# Patient Record
Sex: FEMALE | Race: BLACK OR AFRICAN AMERICAN | NOT HISPANIC OR LATINO | URBAN - METROPOLITAN AREA
[De-identification: names, ages, dates, MRNs, and addresses within clinical notes are randomized per-mention and may not be internally consistent; named-entity substitution may affect disease eponyms.]

---

## 2018-02-24 ENCOUNTER — OUTPATIENT (OUTPATIENT)
Dept: OUTPATIENT SERVICES | Facility: HOSPITAL | Age: 33
LOS: 1 days | End: 2018-02-24
Payer: COMMERCIAL

## 2018-02-24 DIAGNOSIS — Z3A.00 WEEKS OF GESTATION OF PREGNANCY NOT SPECIFIED: ICD-10-CM

## 2018-02-24 DIAGNOSIS — O26.899 OTHER SPECIFIED PREGNANCY RELATED CONDITIONS, UNSPECIFIED TRIMESTER: ICD-10-CM

## 2018-02-24 DIAGNOSIS — Z98.890 OTHER SPECIFIED POSTPROCEDURAL STATES: Chronic | ICD-10-CM

## 2018-02-24 LAB
ALBUMIN SERPL ELPH-MCNC: 3.6 G/DL — SIGNIFICANT CHANGE UP (ref 3.3–5)
ALP SERPL-CCNC: 109 U/L — SIGNIFICANT CHANGE UP (ref 40–120)
ALT FLD-CCNC: 19 U/L — SIGNIFICANT CHANGE UP (ref 4–33)
APPEARANCE UR: SIGNIFICANT CHANGE UP
AST SERPL-CCNC: 21 U/L — SIGNIFICANT CHANGE UP (ref 4–32)
B PERT DNA SPEC QL NAA+PROBE: SIGNIFICANT CHANGE UP
BILIRUB SERPL-MCNC: 0.6 MG/DL — SIGNIFICANT CHANGE UP (ref 0.2–1.2)
BILIRUB UR-MCNC: NEGATIVE — SIGNIFICANT CHANGE UP
BLOOD UR QL VISUAL: NEGATIVE — SIGNIFICANT CHANGE UP
BUN SERPL-MCNC: 8 MG/DL — SIGNIFICANT CHANGE UP (ref 7–23)
C PNEUM DNA SPEC QL NAA+PROBE: NOT DETECTED — SIGNIFICANT CHANGE UP
CALCIUM SERPL-MCNC: 8.5 MG/DL — SIGNIFICANT CHANGE UP (ref 8.4–10.5)
CHLORIDE SERPL-SCNC: 102 MMOL/L — SIGNIFICANT CHANGE UP (ref 98–107)
CO2 SERPL-SCNC: 21 MMOL/L — LOW (ref 22–31)
COLOR SPEC: YELLOW — SIGNIFICANT CHANGE UP
CREAT SERPL-MCNC: 0.6 MG/DL — SIGNIFICANT CHANGE UP (ref 0.5–1.3)
FLUAV H1 2009 PAND RNA SPEC QL NAA+PROBE: POSITIVE — HIGH
FLUAV H1 RNA SPEC QL NAA+PROBE: NOT DETECTED — SIGNIFICANT CHANGE UP
FLUAV H3 RNA SPEC QL NAA+PROBE: NOT DETECTED — SIGNIFICANT CHANGE UP
FLUBV RNA SPEC QL NAA+PROBE: NOT DETECTED — SIGNIFICANT CHANGE UP
GLUCOSE SERPL-MCNC: 73 MG/DL — SIGNIFICANT CHANGE UP (ref 70–99)
GLUCOSE UR-MCNC: NEGATIVE — SIGNIFICANT CHANGE UP
HADV DNA SPEC QL NAA+PROBE: NOT DETECTED — SIGNIFICANT CHANGE UP
HCOV 229E RNA SPEC QL NAA+PROBE: NOT DETECTED — SIGNIFICANT CHANGE UP
HCOV HKU1 RNA SPEC QL NAA+PROBE: NOT DETECTED — SIGNIFICANT CHANGE UP
HCOV NL63 RNA SPEC QL NAA+PROBE: NOT DETECTED — SIGNIFICANT CHANGE UP
HCOV OC43 RNA SPEC QL NAA+PROBE: NOT DETECTED — SIGNIFICANT CHANGE UP
HCT VFR BLD CALC: 36.1 % — SIGNIFICANT CHANGE UP (ref 34.5–45)
HGB BLD-MCNC: 11.4 G/DL — LOW (ref 11.5–15.5)
HMPV RNA SPEC QL NAA+PROBE: NOT DETECTED — SIGNIFICANT CHANGE UP
HPIV1 RNA SPEC QL NAA+PROBE: NOT DETECTED — SIGNIFICANT CHANGE UP
HPIV2 RNA SPEC QL NAA+PROBE: NOT DETECTED — SIGNIFICANT CHANGE UP
HPIV3 RNA SPEC QL NAA+PROBE: NOT DETECTED — SIGNIFICANT CHANGE UP
HPIV4 RNA SPEC QL NAA+PROBE: NOT DETECTED — SIGNIFICANT CHANGE UP
KETONES UR-MCNC: HIGH
LEUKOCYTE ESTERASE UR-ACNC: HIGH
M PNEUMO DNA SPEC QL NAA+PROBE: NOT DETECTED — SIGNIFICANT CHANGE UP
MCHC RBC-ENTMCNC: 29.8 PG — SIGNIFICANT CHANGE UP (ref 27–34)
MCHC RBC-ENTMCNC: 31.6 % — LOW (ref 32–36)
MCV RBC AUTO: 94.5 FL — SIGNIFICANT CHANGE UP (ref 80–100)
MUCOUS THREADS # UR AUTO: SIGNIFICANT CHANGE UP
NITRITE UR-MCNC: NEGATIVE — SIGNIFICANT CHANGE UP
NON-SQ EPI CELLS # UR AUTO: <1 — SIGNIFICANT CHANGE UP
NRBC # FLD: 0 — SIGNIFICANT CHANGE UP
PH UR: 6.5 — SIGNIFICANT CHANGE UP (ref 4.6–8)
PLATELET # BLD AUTO: 274 K/UL — SIGNIFICANT CHANGE UP (ref 150–400)
PMV BLD: 9 FL — SIGNIFICANT CHANGE UP (ref 7–13)
POTASSIUM SERPL-MCNC: 4 MMOL/L — SIGNIFICANT CHANGE UP (ref 3.5–5.3)
POTASSIUM SERPL-SCNC: 4 MMOL/L — SIGNIFICANT CHANGE UP (ref 3.5–5.3)
PROT SERPL-MCNC: 6.8 G/DL — SIGNIFICANT CHANGE UP (ref 6–8.3)
PROT UR-MCNC: 30 MG/DL — HIGH
RBC # BLD: 3.82 M/UL — SIGNIFICANT CHANGE UP (ref 3.8–5.2)
RBC # FLD: 11.8 % — SIGNIFICANT CHANGE UP (ref 10.3–14.5)
RBC CASTS # UR COMP ASSIST: SIGNIFICANT CHANGE UP (ref 0–?)
RSV RNA SPEC QL NAA+PROBE: NOT DETECTED — SIGNIFICANT CHANGE UP
RV+EV RNA SPEC QL NAA+PROBE: NOT DETECTED — SIGNIFICANT CHANGE UP
SODIUM SERPL-SCNC: 139 MMOL/L — SIGNIFICANT CHANGE UP (ref 135–145)
SP GR SPEC: 1.02 — SIGNIFICANT CHANGE UP (ref 1–1.04)
SQUAMOUS # UR AUTO: SIGNIFICANT CHANGE UP
UROBILINOGEN FLD QL: NORMAL MG/DL — SIGNIFICANT CHANGE UP
WBC # BLD: 5.89 K/UL — SIGNIFICANT CHANGE UP (ref 3.8–10.5)
WBC # FLD AUTO: 5.89 K/UL — SIGNIFICANT CHANGE UP (ref 3.8–10.5)
WBC UR QL: HIGH (ref 0–?)

## 2018-02-24 PROCEDURE — 71045 X-RAY EXAM CHEST 1 VIEW: CPT | Mod: 26

## 2018-02-24 RX ADMIN — Medication 75 MILLIGRAM(S): at 20:37

## 2018-02-26 LAB
BACTERIA UR CULT: SIGNIFICANT CHANGE UP
SPECIMEN SOURCE: SIGNIFICANT CHANGE UP

## 2018-03-23 ENCOUNTER — OUTPATIENT (OUTPATIENT)
Dept: OUTPATIENT SERVICES | Facility: HOSPITAL | Age: 33
LOS: 1 days | End: 2018-03-23

## 2018-03-23 DIAGNOSIS — Z3A.00 WEEKS OF GESTATION OF PREGNANCY NOT SPECIFIED: ICD-10-CM

## 2018-03-23 DIAGNOSIS — Z98.890 OTHER SPECIFIED POSTPROCEDURAL STATES: Chronic | ICD-10-CM

## 2018-03-23 DIAGNOSIS — O26.899 OTHER SPECIFIED PREGNANCY RELATED CONDITIONS, UNSPECIFIED TRIMESTER: ICD-10-CM

## 2018-03-23 LAB
ALBUMIN SERPL ELPH-MCNC: 3.4 G/DL — SIGNIFICANT CHANGE UP (ref 3.3–5)
ALP SERPL-CCNC: 121 U/L — HIGH (ref 40–120)
ALT FLD-CCNC: 15 U/L — SIGNIFICANT CHANGE UP (ref 4–33)
APTT BLD: 29.2 SEC — SIGNIFICANT CHANGE UP (ref 27.5–37.4)
AST SERPL-CCNC: 23 U/L — SIGNIFICANT CHANGE UP (ref 4–32)
BILIRUB SERPL-MCNC: 0.5 MG/DL — SIGNIFICANT CHANGE UP (ref 0.2–1.2)
BLD GP AB SCN SERPL QL: NEGATIVE — SIGNIFICANT CHANGE UP
BUN SERPL-MCNC: 9 MG/DL — SIGNIFICANT CHANGE UP (ref 7–23)
CALCIUM SERPL-MCNC: 9 MG/DL — SIGNIFICANT CHANGE UP (ref 8.4–10.5)
CHLORIDE SERPL-SCNC: 105 MMOL/L — SIGNIFICANT CHANGE UP (ref 98–107)
CO2 SERPL-SCNC: 18 MMOL/L — LOW (ref 22–31)
CREAT SERPL-MCNC: 0.63 MG/DL — SIGNIFICANT CHANGE UP (ref 0.5–1.3)
FIBRINOGEN PPP-MCNC: 517.2 MG/DL — HIGH (ref 310–510)
GLUCOSE SERPL-MCNC: 90 MG/DL — SIGNIFICANT CHANGE UP (ref 70–99)
HCT VFR BLD CALC: 35.9 % — SIGNIFICANT CHANGE UP (ref 34.5–45)
HGB BLD-MCNC: 11.7 G/DL — SIGNIFICANT CHANGE UP (ref 11.5–15.5)
INR BLD: 0.98 — SIGNIFICANT CHANGE UP (ref 0.88–1.17)
MCHC RBC-ENTMCNC: 30.8 PG — SIGNIFICANT CHANGE UP (ref 27–34)
MCHC RBC-ENTMCNC: 32.6 % — SIGNIFICANT CHANGE UP (ref 32–36)
MCV RBC AUTO: 94.5 FL — SIGNIFICANT CHANGE UP (ref 80–100)
NRBC # FLD: 0 — SIGNIFICANT CHANGE UP
PLATELET # BLD AUTO: 289 K/UL — SIGNIFICANT CHANGE UP (ref 150–400)
PMV BLD: 9 FL — SIGNIFICANT CHANGE UP (ref 7–13)
POTASSIUM SERPL-MCNC: 4.2 MMOL/L — SIGNIFICANT CHANGE UP (ref 3.5–5.3)
POTASSIUM SERPL-SCNC: 4.2 MMOL/L — SIGNIFICANT CHANGE UP (ref 3.5–5.3)
PROT SERPL-MCNC: 6.5 G/DL — SIGNIFICANT CHANGE UP (ref 6–8.3)
PROTHROM AB SERPL-ACNC: 11.3 SEC — SIGNIFICANT CHANGE UP (ref 9.8–13.1)
RBC # BLD: 3.8 M/UL — SIGNIFICANT CHANGE UP (ref 3.8–5.2)
RBC # FLD: 12.1 % — SIGNIFICANT CHANGE UP (ref 10.3–14.5)
RH IG SCN BLD-IMP: POSITIVE — SIGNIFICANT CHANGE UP
SODIUM SERPL-SCNC: 140 MMOL/L — SIGNIFICANT CHANGE UP (ref 135–145)
WBC # BLD: 5.57 K/UL — SIGNIFICANT CHANGE UP (ref 3.8–10.5)
WBC # FLD AUTO: 5.57 K/UL — SIGNIFICANT CHANGE UP (ref 3.8–10.5)

## 2018-04-08 ENCOUNTER — INPATIENT (INPATIENT)
Facility: HOSPITAL | Age: 33
LOS: 1 days | Discharge: HOME | End: 2018-04-10
Attending: OBSTETRICS & GYNECOLOGY | Admitting: OBSTETRICS & GYNECOLOGY

## 2018-04-08 VITALS — WEIGHT: 176.37 LBS | HEIGHT: 64 IN

## 2018-04-08 DIAGNOSIS — Z3A.00 WEEKS OF GESTATION OF PREGNANCY NOT SPECIFIED: ICD-10-CM

## 2018-04-08 DIAGNOSIS — Z98.890 OTHER SPECIFIED POSTPROCEDURAL STATES: Chronic | ICD-10-CM

## 2018-04-08 DIAGNOSIS — O26.899 OTHER SPECIFIED PREGNANCY RELATED CONDITIONS, UNSPECIFIED TRIMESTER: ICD-10-CM

## 2018-04-08 LAB
BASOPHILS # BLD AUTO: 0.03 K/UL — SIGNIFICANT CHANGE UP (ref 0–0.2)
BASOPHILS NFR BLD AUTO: 0.5 % — SIGNIFICANT CHANGE UP (ref 0–2)
BLD GP AB SCN SERPL QL: NEGATIVE — SIGNIFICANT CHANGE UP
EOSINOPHIL # BLD AUTO: 0.07 K/UL — SIGNIFICANT CHANGE UP (ref 0–0.5)
EOSINOPHIL NFR BLD AUTO: 1.1 % — SIGNIFICANT CHANGE UP (ref 0–6)
HCT VFR BLD CALC: 37.6 % — SIGNIFICANT CHANGE UP (ref 34.5–45)
HGB BLD-MCNC: 12.3 G/DL — SIGNIFICANT CHANGE UP (ref 11.5–15.5)
IMM GRANULOCYTES # BLD AUTO: 0.03 # — SIGNIFICANT CHANGE UP
IMM GRANULOCYTES NFR BLD AUTO: 0.5 % — SIGNIFICANT CHANGE UP (ref 0–1.5)
LYMPHOCYTES # BLD AUTO: 2.32 K/UL — SIGNIFICANT CHANGE UP (ref 1–3.3)
LYMPHOCYTES # BLD AUTO: 36.2 % — SIGNIFICANT CHANGE UP (ref 13–44)
MCHC RBC-ENTMCNC: 30.8 PG — SIGNIFICANT CHANGE UP (ref 27–34)
MCHC RBC-ENTMCNC: 32.7 % — SIGNIFICANT CHANGE UP (ref 32–36)
MCV RBC AUTO: 94.2 FL — SIGNIFICANT CHANGE UP (ref 80–100)
MONOCYTES # BLD AUTO: 0.58 K/UL — SIGNIFICANT CHANGE UP (ref 0–0.9)
MONOCYTES NFR BLD AUTO: 9 % — SIGNIFICANT CHANGE UP (ref 2–14)
NEUTROPHILS # BLD AUTO: 3.38 K/UL — SIGNIFICANT CHANGE UP (ref 1.8–7.4)
NEUTROPHILS NFR BLD AUTO: 52.7 % — SIGNIFICANT CHANGE UP (ref 43–77)
NRBC # FLD: 0 — SIGNIFICANT CHANGE UP
PLATELET # BLD AUTO: 327 K/UL — SIGNIFICANT CHANGE UP (ref 150–400)
PMV BLD: 9.3 FL — SIGNIFICANT CHANGE UP (ref 7–13)
RBC # BLD: 3.99 M/UL — SIGNIFICANT CHANGE UP (ref 3.8–5.2)
RBC # FLD: 11.7 % — SIGNIFICANT CHANGE UP (ref 10.3–14.5)
RH IG SCN BLD-IMP: POSITIVE — SIGNIFICANT CHANGE UP
WBC # BLD: 6.41 K/UL — SIGNIFICANT CHANGE UP (ref 3.8–10.5)
WBC # FLD AUTO: 6.41 K/UL — SIGNIFICANT CHANGE UP (ref 3.8–10.5)

## 2018-04-08 RX ORDER — OXYTOCIN 10 UNIT/ML
333.33 VIAL (ML) INJECTION
Qty: 20 | Refills: 0 | Status: COMPLETED | OUTPATIENT
Start: 2018-04-08

## 2018-04-08 RX ORDER — SODIUM CHLORIDE 9 MG/ML
1000 INJECTION, SOLUTION INTRAVENOUS
Qty: 0 | Refills: 0 | Status: DISCONTINUED | OUTPATIENT
Start: 2018-04-08 | End: 2018-04-08

## 2018-04-08 RX ORDER — ACETAMINOPHEN 500 MG
975 TABLET ORAL EVERY 6 HOURS
Qty: 0 | Refills: 0 | Status: COMPLETED | OUTPATIENT
Start: 2018-04-08 | End: 2019-03-07

## 2018-04-08 RX ORDER — DIBUCAINE 1 %
1 OINTMENT (GRAM) RECTAL EVERY 4 HOURS
Qty: 0 | Refills: 0 | Status: DISCONTINUED | OUTPATIENT
Start: 2018-04-08 | End: 2018-04-09

## 2018-04-08 RX ORDER — INFLUENZA VIRUS VACCINE 15; 15; 15; 15 UG/.5ML; UG/.5ML; UG/.5ML; UG/.5ML
0.5 SUSPENSION INTRAMUSCULAR ONCE
Qty: 0 | Refills: 0 | Status: DISCONTINUED | OUTPATIENT
Start: 2018-04-08 | End: 2018-04-10

## 2018-04-08 RX ORDER — SODIUM CHLORIDE 9 MG/ML
3 INJECTION INTRAMUSCULAR; INTRAVENOUS; SUBCUTANEOUS EVERY 8 HOURS
Qty: 0 | Refills: 0 | Status: DISCONTINUED | OUTPATIENT
Start: 2018-04-08 | End: 2018-04-09

## 2018-04-08 RX ORDER — GLYCERIN ADULT
1 SUPPOSITORY, RECTAL RECTAL AT BEDTIME
Qty: 0 | Refills: 0 | Status: DISCONTINUED | OUTPATIENT
Start: 2018-04-08 | End: 2018-04-10

## 2018-04-08 RX ORDER — PRAMOXINE HYDROCHLORIDE 150 MG/15G
1 AEROSOL, FOAM RECTAL EVERY 4 HOURS
Qty: 0 | Refills: 0 | Status: DISCONTINUED | OUTPATIENT
Start: 2018-04-08 | End: 2018-04-09

## 2018-04-08 RX ORDER — DOCUSATE SODIUM 100 MG
100 CAPSULE ORAL
Qty: 0 | Refills: 0 | Status: DISCONTINUED | OUTPATIENT
Start: 2018-04-08 | End: 2018-04-10

## 2018-04-08 RX ORDER — SIMETHICONE 80 MG/1
80 TABLET, CHEWABLE ORAL EVERY 6 HOURS
Qty: 0 | Refills: 0 | Status: DISCONTINUED | OUTPATIENT
Start: 2018-04-08 | End: 2018-04-10

## 2018-04-08 RX ORDER — MAGNESIUM HYDROXIDE 400 MG/1
30 TABLET, CHEWABLE ORAL
Qty: 0 | Refills: 0 | Status: DISCONTINUED | OUTPATIENT
Start: 2018-04-08 | End: 2018-04-10

## 2018-04-08 RX ORDER — OXYCODONE HYDROCHLORIDE 5 MG/1
5 TABLET ORAL
Qty: 0 | Refills: 0 | Status: DISCONTINUED | OUTPATIENT
Start: 2018-04-08 | End: 2018-04-10

## 2018-04-08 RX ORDER — OXYCODONE HYDROCHLORIDE 5 MG/1
5 TABLET ORAL EVERY 4 HOURS
Qty: 0 | Refills: 0 | Status: DISCONTINUED | OUTPATIENT
Start: 2018-04-08 | End: 2018-04-10

## 2018-04-08 RX ORDER — IBUPROFEN 200 MG
600 TABLET ORAL EVERY 6 HOURS
Qty: 0 | Refills: 0 | Status: COMPLETED | OUTPATIENT
Start: 2018-04-08 | End: 2019-03-07

## 2018-04-08 RX ORDER — OXYTOCIN 10 UNIT/ML
333.33 VIAL (ML) INJECTION
Qty: 20 | Refills: 0 | Status: DISCONTINUED | OUTPATIENT
Start: 2018-04-08 | End: 2018-04-08

## 2018-04-08 RX ORDER — DIPHENHYDRAMINE HCL 50 MG
25 CAPSULE ORAL EVERY 6 HOURS
Qty: 0 | Refills: 0 | Status: DISCONTINUED | OUTPATIENT
Start: 2018-04-08 | End: 2018-04-10

## 2018-04-08 RX ORDER — AER TRAVELER 0.5 G/1
1 SOLUTION RECTAL; TOPICAL EVERY 4 HOURS
Qty: 0 | Refills: 0 | Status: DISCONTINUED | OUTPATIENT
Start: 2018-04-08 | End: 2018-04-09

## 2018-04-08 RX ORDER — SODIUM CHLORIDE 9 MG/ML
1000 INJECTION, SOLUTION INTRAVENOUS ONCE
Qty: 0 | Refills: 0 | Status: COMPLETED | OUTPATIENT
Start: 2018-04-08 | End: 2018-04-08

## 2018-04-08 RX ORDER — OXYTOCIN 10 UNIT/ML
41.67 VIAL (ML) INJECTION
Qty: 20 | Refills: 0 | Status: DISCONTINUED | OUTPATIENT
Start: 2018-04-08 | End: 2018-04-09

## 2018-04-08 RX ORDER — HYDROCORTISONE 1 %
1 OINTMENT (GRAM) TOPICAL EVERY 4 HOURS
Qty: 0 | Refills: 0 | Status: DISCONTINUED | OUTPATIENT
Start: 2018-04-08 | End: 2018-04-09

## 2018-04-08 RX ORDER — TETANUS TOXOID, REDUCED DIPHTHERIA TOXOID AND ACELLULAR PERTUSSIS VACCINE, ADSORBED 5; 2.5; 8; 8; 2.5 [IU]/.5ML; [IU]/.5ML; UG/.5ML; UG/.5ML; UG/.5ML
0.5 SUSPENSION INTRAMUSCULAR ONCE
Qty: 0 | Refills: 0 | Status: DISCONTINUED | OUTPATIENT
Start: 2018-04-08 | End: 2018-04-10

## 2018-04-08 RX ORDER — LANOLIN
1 OINTMENT (GRAM) TOPICAL EVERY 6 HOURS
Qty: 0 | Refills: 0 | Status: DISCONTINUED | OUTPATIENT
Start: 2018-04-08 | End: 2018-04-10

## 2018-04-08 RX ORDER — OXYTOCIN 10 UNIT/ML
2 VIAL (ML) INJECTION
Qty: 30 | Refills: 0 | Status: DISCONTINUED | OUTPATIENT
Start: 2018-04-08 | End: 2018-04-08

## 2018-04-08 RX ORDER — KETOROLAC TROMETHAMINE 30 MG/ML
30 SYRINGE (ML) INJECTION ONCE
Qty: 0 | Refills: 0 | Status: DISCONTINUED | OUTPATIENT
Start: 2018-04-08 | End: 2018-04-10

## 2018-04-08 RX ADMIN — Medication 125 MILLIUNIT(S)/MIN: at 23:26

## 2018-04-08 RX ADMIN — SODIUM CHLORIDE 2000 MILLILITER(S): 9 INJECTION, SOLUTION INTRAVENOUS at 21:11

## 2018-04-08 RX ADMIN — Medication 2 MILLIUNIT(S)/MIN: at 22:34

## 2018-04-08 RX ADMIN — Medication 1000 MILLIUNIT(S)/MIN: at 23:15

## 2018-04-08 RX ADMIN — SODIUM CHLORIDE 125 MILLILITER(S): 9 INJECTION, SOLUTION INTRAVENOUS at 22:34

## 2018-04-09 LAB — T PALLIDUM AB TITR SER: NEGATIVE — SIGNIFICANT CHANGE UP

## 2018-04-09 RX ORDER — ACETAMINOPHEN 500 MG
975 TABLET ORAL EVERY 6 HOURS
Qty: 0 | Refills: 0 | Status: DISCONTINUED | OUTPATIENT
Start: 2018-04-09 | End: 2018-04-10

## 2018-04-09 RX ORDER — IBUPROFEN 200 MG
600 TABLET ORAL EVERY 6 HOURS
Qty: 0 | Refills: 0 | Status: DISCONTINUED | OUTPATIENT
Start: 2018-04-09 | End: 2018-04-10

## 2018-04-09 RX ADMIN — Medication 600 MILLIGRAM(S): at 04:00

## 2018-04-09 RX ADMIN — Medication 600 MILLIGRAM(S): at 04:39

## 2018-04-09 RX ADMIN — Medication 100 MILLIGRAM(S): at 17:00

## 2018-04-09 RX ADMIN — Medication 600 MILLIGRAM(S): at 16:15

## 2018-04-09 NOTE — LACTATION INITIAL EVALUATION - LACTATION INTERVENTIONS
assisted with deep latch and positioning discussed  signs  of  effective  feeding and  swallowing.  discussed  compression at  breast when  nbn  stops  drinking  and  is  still sucking.  instructed  to offer both  breast at a feeding ,feed on cue and safe  skin to skin./initiate skin to skin/initiate hand expression routine

## 2018-04-09 NOTE — LACTATION INITIAL EVALUATION - INTERVENTION OUTCOME
nbn demonstrated  deep latch and  performed  with sucking and swallowing  noted  .  offered assistance  as  needed  . reviewed  how  to  know  nbn  is  effective at  feeding  and   getting  enough  breast  milk./demonstrates understanding of teaching/verbalizes understanding

## 2018-04-10 ENCOUNTER — TRANSCRIPTION ENCOUNTER (OUTPATIENT)
Age: 33
End: 2018-04-10

## 2018-04-10 VITALS
RESPIRATION RATE: 18 BRPM | DIASTOLIC BLOOD PRESSURE: 64 MMHG | HEART RATE: 67 BPM | TEMPERATURE: 98 F | OXYGEN SATURATION: 100 % | SYSTOLIC BLOOD PRESSURE: 122 MMHG

## 2018-04-10 RX ORDER — ACETAMINOPHEN 500 MG
3 TABLET ORAL
Qty: 0 | Refills: 0 | DISCHARGE
Start: 2018-04-10

## 2018-04-10 RX ORDER — IBUPROFEN 200 MG
1 TABLET ORAL
Qty: 0 | Refills: 0 | DISCHARGE
Start: 2018-04-10

## 2018-04-10 RX ADMIN — Medication 0.5 MILLILITER(S): at 11:03

## 2018-04-10 RX ADMIN — Medication 600 MILLIGRAM(S): at 11:10

## 2018-04-10 RX ADMIN — Medication 600 MILLIGRAM(S): at 11:42

## 2018-04-10 RX ADMIN — Medication 100 MILLIGRAM(S): at 11:09

## 2018-04-10 NOTE — DISCHARGE NOTE OB - MATERIALS PROVIDED
Back To Sleep Handout/Birth Certificate Instructions/  Immunization Record/Breastfeeding Log/Breastfeeding Mother’s Support Group Information/[facility] Hearing Screen Program/Guide to Postpartum Care/[facility]  Screening Program/Bottle Feeding Log/Shaken Baby Prevention Handout/Breastfeeding Guide and Packet

## 2018-04-10 NOTE — DISCHARGE NOTE OB - MEDICATION SUMMARY - MEDICATIONS TO STOP TAKING
I will STOP taking the medications listed below when I get home from the hospital:    oseltamivir 75 mg oral capsule  -- 1 cap(s) by mouth 2 times a day   -- Check with your doctor before becoming pregnant.  Finish all this medication unless otherwise directed by prescriber.

## 2018-04-10 NOTE — DISCHARGE NOTE OB - PATIENT PORTAL LINK FT
You can access the [employer] [facility], offered by [facility], by registering with the following website: [email or web address]

## 2018-04-10 NOTE — DISCHARGE NOTE OB - MEDICATION SUMMARY - MEDICATIONS TO TAKE
I will START or STAY ON the medications listed below when I get home from the hospital:    ibuprofen 600 mg oral tablet  -- 1 tab(s) by mouth every 6 hours, As Needed  -- Indication: For Other pregnancy-related conditions, antepartum    acetaminophen 325 mg oral tablet  -- 3 tab(s) by mouth every 6 hours, As Needed  -- Indication: For Other pregnancy-related conditions, antepartum    Prenatal Multivitamins with Folic Acid 1 mg oral tablet  -- 1 tab(s) by mouth once a day  -- Indication: For Other pregnancy-related conditions, antepartum

## 2019-09-15 ENCOUNTER — TRANSCRIPTION ENCOUNTER (OUTPATIENT)
Age: 34
End: 2019-09-15

## 2019-09-16 ENCOUNTER — OUTPATIENT (OUTPATIENT)
Dept: INPATIENT UNIT | Facility: HOSPITAL | Age: 34
LOS: 1 days | Discharge: HOME | End: 2019-09-16

## 2019-09-16 VITALS
HEART RATE: 82 BPM | RESPIRATION RATE: 18 BRPM | HEIGHT: 64 IN | SYSTOLIC BLOOD PRESSURE: 122 MMHG | TEMPERATURE: 98 F | DIASTOLIC BLOOD PRESSURE: 73 MMHG | WEIGHT: 173.06 LBS

## 2019-09-16 VITALS — RESPIRATION RATE: 24 BRPM | HEART RATE: 90 BPM | OXYGEN SATURATION: 100 %

## 2019-09-16 DIAGNOSIS — O34.30 MATERNAL CARE FOR CERVICAL INCOMPETENCE, UNSPECIFIED TRIMESTER: ICD-10-CM

## 2019-09-16 DIAGNOSIS — Z98.890 OTHER SPECIFIED POSTPROCEDURAL STATES: Chronic | ICD-10-CM

## 2019-09-16 LAB
BASOPHILS # BLD AUTO: 0.03 K/UL — SIGNIFICANT CHANGE UP (ref 0–0.2)
BASOPHILS NFR BLD AUTO: 0.5 % — SIGNIFICANT CHANGE UP (ref 0–2)
BLD GP AB SCN SERPL QL: NEGATIVE — SIGNIFICANT CHANGE UP
EOSINOPHIL # BLD AUTO: 0.05 K/UL — SIGNIFICANT CHANGE UP (ref 0–0.5)
EOSINOPHIL NFR BLD AUTO: 0.8 % — SIGNIFICANT CHANGE UP (ref 0–6)
HCT VFR BLD CALC: 34.4 % — LOW (ref 34.5–45)
HGB BLD-MCNC: 11 G/DL — LOW (ref 11.5–15.5)
IMM GRANULOCYTES NFR BLD AUTO: 0.8 % — SIGNIFICANT CHANGE UP (ref 0–1.5)
LYMPHOCYTES # BLD AUTO: 1.54 K/UL — SIGNIFICANT CHANGE UP (ref 1–3.3)
LYMPHOCYTES # BLD AUTO: 23.7 % — SIGNIFICANT CHANGE UP (ref 13–44)
MCHC RBC-ENTMCNC: 29.7 PG — SIGNIFICANT CHANGE UP (ref 27–34)
MCHC RBC-ENTMCNC: 32 % — SIGNIFICANT CHANGE UP (ref 32–36)
MCV RBC AUTO: 93 FL — SIGNIFICANT CHANGE UP (ref 80–100)
MONOCYTES # BLD AUTO: 0.41 K/UL — SIGNIFICANT CHANGE UP (ref 0–0.9)
MONOCYTES NFR BLD AUTO: 6.3 % — SIGNIFICANT CHANGE UP (ref 2–14)
NEUTROPHILS # BLD AUTO: 4.41 K/UL — SIGNIFICANT CHANGE UP (ref 1.8–7.4)
NEUTROPHILS NFR BLD AUTO: 67.9 % — SIGNIFICANT CHANGE UP (ref 43–77)
NRBC # FLD: 0.02 K/UL — SIGNIFICANT CHANGE UP (ref 0–0)
PLATELET # BLD AUTO: 311 K/UL — SIGNIFICANT CHANGE UP (ref 150–400)
PMV BLD: 9.4 FL — SIGNIFICANT CHANGE UP (ref 7–13)
RBC # BLD: 3.7 M/UL — LOW (ref 3.8–5.2)
RBC # FLD: 11.6 % — SIGNIFICANT CHANGE UP (ref 10.3–14.5)
RH IG SCN BLD-IMP: POSITIVE — SIGNIFICANT CHANGE UP
WBC # BLD: 6.49 K/UL — SIGNIFICANT CHANGE UP (ref 3.8–10.5)
WBC # FLD AUTO: 6.49 K/UL — SIGNIFICANT CHANGE UP (ref 3.8–10.5)

## 2019-09-16 RX ORDER — ACETAMINOPHEN 500 MG
1000 TABLET ORAL ONCE
Refills: 0 | Status: COMPLETED | OUTPATIENT
Start: 2019-09-16 | End: 2019-09-16

## 2019-09-16 RX ORDER — INDOMETHACIN 50 MG
50 CAPSULE ORAL ONCE
Refills: 0 | Status: COMPLETED | OUTPATIENT
Start: 2019-09-16 | End: 2019-09-16

## 2019-09-16 RX ORDER — FAMOTIDINE 10 MG/ML
20 INJECTION INTRAVENOUS ONCE
Refills: 0 | Status: COMPLETED | OUTPATIENT
Start: 2019-09-16 | End: 2019-09-16

## 2019-09-16 RX ORDER — INDOMETHACIN 50 MG
1 CAPSULE ORAL
Qty: 8 | Refills: 0
Start: 2019-09-16 | End: 2019-09-17

## 2019-09-16 RX ORDER — CITRIC ACID/SODIUM CITRATE 300-500 MG
30 SOLUTION, ORAL ORAL ONCE
Refills: 0 | Status: COMPLETED | OUTPATIENT
Start: 2019-09-16 | End: 2019-09-16

## 2019-09-16 RX ORDER — SODIUM CHLORIDE 9 MG/ML
1000 INJECTION, SOLUTION INTRAVENOUS
Refills: 0 | Status: DISCONTINUED | OUTPATIENT
Start: 2019-09-16 | End: 2019-09-16

## 2019-09-16 RX ORDER — INFLUENZA VIRUS VACCINE 15; 15; 15; 15 UG/.5ML; UG/.5ML; UG/.5ML; UG/.5ML
0.5 SUSPENSION INTRAMUSCULAR ONCE
Refills: 0 | Status: DISCONTINUED | OUTPATIENT
Start: 2019-09-16 | End: 2019-09-16

## 2019-09-16 RX ORDER — INDOMETHACIN 50 MG
25 CAPSULE ORAL EVERY 6 HOURS
Refills: 0 | Status: DISCONTINUED | OUTPATIENT
Start: 2019-09-16 | End: 2019-09-16

## 2019-09-16 RX ORDER — METOCLOPRAMIDE HCL 10 MG
10 TABLET ORAL ONCE
Refills: 0 | Status: COMPLETED | OUTPATIENT
Start: 2019-09-16 | End: 2019-09-16

## 2019-09-16 RX ORDER — SODIUM CHLORIDE 9 MG/ML
1000 INJECTION, SOLUTION INTRAVENOUS ONCE
Refills: 0 | Status: COMPLETED | OUTPATIENT
Start: 2019-09-16 | End: 2019-09-16

## 2019-09-16 RX ORDER — OXYTOCIN 10 UNIT/ML
333.33 VIAL (ML) INJECTION
Qty: 20 | Refills: 0 | Status: DISCONTINUED | OUTPATIENT
Start: 2019-09-16 | End: 2019-09-16

## 2019-09-16 RX ADMIN — Medication 50 MILLIGRAM(S): at 07:27

## 2019-09-16 RX ADMIN — Medication 400 MILLIGRAM(S): at 10:42

## 2019-09-16 RX ADMIN — FAMOTIDINE 20 MILLIGRAM(S): 10 INJECTION INTRAVENOUS at 07:17

## 2019-09-16 RX ADMIN — Medication 30 MILLILITER(S): at 07:18

## 2019-09-16 RX ADMIN — Medication 1000 MILLIGRAM(S): at 10:55

## 2019-09-16 RX ADMIN — SODIUM CHLORIDE 125 MILLILITER(S): 9 INJECTION, SOLUTION INTRAVENOUS at 09:57

## 2019-09-16 RX ADMIN — SODIUM CHLORIDE 2000 MILLILITER(S): 9 INJECTION, SOLUTION INTRAVENOUS at 06:52

## 2019-09-16 RX ADMIN — Medication 25 MILLIGRAM(S): at 13:35

## 2019-09-16 RX ADMIN — Medication 10 MILLIGRAM(S): at 07:17

## 2019-09-16 RX ADMIN — Medication 50 MILLIGRAM(S): at 07:45

## 2019-09-16 NOTE — OB PROVIDER H&P - ASSESSMENT
32 yo Female  at 19w5d presents for rescue cerclage placement for shortened cervix    Admit to L&D  Routine labs/nst/ivf @200cc/hr  Pepcid, Reglan, Bicitra  Indocin 50mg x1 pre op  prenatal record summary available  prenatal work sheet reviewed  sono to confirm FH  pt willing to accept blood if needed, consents obtained  Dr. Jackson informed  Anesthesia consult for pain management    [facility], PAC  #[id number]

## 2019-09-16 NOTE — OB PROVIDER H&P - NS_OBGYNHISTORY_OBGYN_ALL_OB_FT
2014  FT male [id number] no complications  2018  f/t male 7lb1oz no complications    2017 <12w D&C    gyn: denies abn pap/std/hpv

## 2019-09-16 NOTE — BRIEF OPERATIVE NOTE - NSICDXBRIEFPOSTOP_GEN_ALL_CORE_FT
POST-OP DIAGNOSIS:  Cervical insufficiency in pregnancy, antepartum, second trimester 16-Sep-2019 19:48:18  Dane Rivas
POST-OP DIAGNOSIS:  Pregnancy, twins, delivered 16-Sep-2019 10:43:45  Katt Truong  Severe preeclampsia, third trimester 16-Sep-2019 10:43:25  Katt Truong

## 2019-09-16 NOTE — DISCHARGE NOTE ANTEPARTUM - HOSPITAL COURSE
Ken cerclage placed without complication. Cervical length after procedure 3.79cm. Pt to follow up with Dr. Jackson in the office.

## 2019-09-16 NOTE — DISCHARGE NOTE ANTEPARTUM - CARE PROVIDER_API CALL
Ariel Jackson)  MaternalFetal Medicine; Obstetrics and Gynecology  [address] [place] [address], [address]  [place] [address]  Phone: [phone]  Fax: [phone]  Follow Up Time:

## 2019-09-16 NOTE — BRIEF OPERATIVE NOTE - OPERATION/FINDINGS
cervix dilated to 1 cm, cervical length of 3.8cm after procedure
Grossly normal uterus, tubes, ovaries. Patient extremely edematous in the skin and subcutaneous layers. Vulvar swelling profound at beginning or surgery, decreased by a third by the end of case. Baby A vertex, live male infant. No cord around neck. Delayed cord clamping done. Baby B delivered breech, feet first, live female infant. No cord around neck. Cord clamping done. Gases collected. Placenta delivered via gentle massage. Uterus closed in two layers. Bladder flap, peritoneum, muscle, fascia, subcutaneous layer, and skin closed. Skin closed in running fashion with Steri-strips and pressure dressing in place.

## 2019-09-16 NOTE — BRIEF OPERATIVE NOTE - NSICDXBRIEFPROCEDURE_GEN_ALL_CORE_FT
PROCEDURES:  Cerclage, cervix, Ken, obstetric 16-Sep-2019 19:47:42  Dane Rivas
PROCEDURES:  Primary low transverse  section 16-Sep-2019 10:42:48  Katt Truong

## 2019-09-16 NOTE — OB PROVIDER H&P - NSHPLABSRESULTS_GEN_ALL_CORE
Type + Screen (04.08.18 @ 20:42)    ABO Interpretation: A    Rh Interpretation: Positive    Antibody Screen: Negative

## 2019-09-16 NOTE — OB PROVIDER H&P - HISTORY OF PRESENT ILLNESS
This 34 yo female  at 19w5d presents for cerclage placement for shortened cervix diagnosed at 20w sonogram. Pt denies any LOF or VB

## 2019-09-16 NOTE — DISCHARGE NOTE ANTEPARTUM - PATIENT PORTAL LINK FT
You can access the FollowMyHealth Patient Portal offered by [facility] by registering at the following website: [email or web address]. By joining [email or web address] FollowMyHealth portal, you will also be able to view your health information using other applications (apps) compatible with our system.

## 2019-09-16 NOTE — DISCHARGE NOTE ANTEPARTUM - CARE PLAN
Principal Discharge DX:	Cervical insufficiency during pregnancy in second trimester, antepartum  Goal:	wellness  Assessment and plan of treatment:	pt will take indocin 25mg q6h x2 days, pt to follow up with Dr. Jackson in 1 week

## 2019-09-16 NOTE — BRIEF OPERATIVE NOTE - NSICDXBRIEFPREOP_GEN_ALL_CORE_FT
PRE-OP DIAGNOSIS:  Cervical insufficiency in pregnancy, antepartum, second trimester 16-Sep-2019 19:48:00  Dane Rivas
PRE-OP DIAGNOSIS:  Pregnancy, twins, antepartum 16-Sep-2019 10:43:09  Katt Truong  Severe preeclampsia, third trimester 16-Sep-2019 10:43:19  Katt Truong

## 2019-09-16 NOTE — OB PROVIDER H&P - NSHPPHYSICALEXAM_GEN_ALL_CORE
Gen: wdwn female, A&Ox3, NAD,   Chest: s1s2 + RRR, no w/r/r  abd: gravid,  ext: no edema noted b/l lower extremities    5'4"  173  BMI : 29.7    Sono: performed by Dr. Truong

## 2019-09-16 NOTE — DISCHARGE NOTE ANTEPARTUM - MEDICATION SUMMARY - MEDICATIONS TO TAKE
I will START or STAY ON the medications listed below when I get home from the hospital:    acetaminophen 325 mg oral tablet  -- 3 tab(s) by mouth every 6 hours, As Needed  -- Indication: For pain    indomethacin 25 mg oral capsule  -- 1 cap(s) by mouth every 6 hours MDD:4  -- Do not take aspirin or aspirin containing products without knowledge and consent of your physician.  It is very important that you take or use this exactly as directed.  Do not skip doses or discontinue unless directed by your doctor.  May cause drowsiness or dizziness.  Obtain medical advice before taking any non-prescription drugs as some may affect the action of this medication.  Take with food or milk.    -- Indication: For cramping    Prenatal Multivitamins with Folic Acid 1 mg oral tablet  -- 1 tab(s) by mouth once a day  -- Indication: For supplement

## 2019-09-25 ENCOUNTER — OUTPATIENT (OUTPATIENT)
Dept: OUTPATIENT SERVICES | Facility: HOSPITAL | Age: 34
LOS: 1 days | End: 2019-09-25

## 2019-09-25 ENCOUNTER — TRANSCRIPTION ENCOUNTER (OUTPATIENT)
Age: 34
End: 2019-09-25

## 2019-09-25 VITALS
SYSTOLIC BLOOD PRESSURE: 110 MMHG | WEIGHT: 171.08 LBS | HEIGHT: 63 IN | TEMPERATURE: 98 F | DIASTOLIC BLOOD PRESSURE: 72 MMHG | HEART RATE: 68 BPM | RESPIRATION RATE: 14 BRPM | OXYGEN SATURATION: 99 %

## 2019-09-25 DIAGNOSIS — Z98.890 OTHER SPECIFIED POSTPROCEDURAL STATES: Chronic | ICD-10-CM

## 2019-09-25 DIAGNOSIS — O34.32 MATERNAL CARE FOR CERVICAL INCOMPETENCE, SECOND TRIMESTER: ICD-10-CM

## 2019-09-25 DIAGNOSIS — N88.3 INCOMPETENCE OF CERVIX UTERI: ICD-10-CM

## 2019-09-25 LAB
BLD GP AB SCN SERPL QL: NEGATIVE — SIGNIFICANT CHANGE UP
HCT VFR BLD CALC: 35.9 % — SIGNIFICANT CHANGE UP (ref 34.5–45)
HGB BLD-MCNC: 11 G/DL — LOW (ref 11.5–15.5)
MCHC RBC-ENTMCNC: 28.7 PG — SIGNIFICANT CHANGE UP (ref 27–34)
MCHC RBC-ENTMCNC: 30.6 % — LOW (ref 32–36)
MCV RBC AUTO: 93.7 FL — SIGNIFICANT CHANGE UP (ref 80–100)
NRBC # FLD: 0 K/UL — SIGNIFICANT CHANGE UP (ref 0–0)
PLATELET # BLD AUTO: 374 K/UL — SIGNIFICANT CHANGE UP (ref 150–400)
PMV BLD: 10.1 FL — SIGNIFICANT CHANGE UP (ref 7–13)
RBC # BLD: 3.83 M/UL — SIGNIFICANT CHANGE UP (ref 3.8–5.2)
RBC # FLD: 11.1 % — SIGNIFICANT CHANGE UP (ref 10.3–14.5)
RH IG SCN BLD-IMP: POSITIVE — SIGNIFICANT CHANGE UP
WBC # BLD: 9.09 K/UL — SIGNIFICANT CHANGE UP (ref 3.8–10.5)
WBC # FLD AUTO: 9.09 K/UL — SIGNIFICANT CHANGE UP (ref 3.8–10.5)

## 2019-09-25 NOTE — H&P PST ADULT - NSANTHOSAYNRD_GEN_A_CORE
No. TEJAS screening performed.  STOP BANG Legend: 0-2 = LOW Risk; 3-4 = INTERMEDIATE Risk; 5-8 = HIGH Risk

## 2019-09-25 NOTE — H&P PST ADULT - NSICDXPROBLEM_GEN_ALL_CORE_FT
PROBLEM DIAGNOSES  Problem: Incompetent cervix  Assessment and Plan: Pt. is scheduled for cerclage of cervix 9/26/19.  Pt. verbalized understanding of instructions as discussed and outlined on the instruction sheet.  Discussed with Dr. Goodwin.

## 2019-09-25 NOTE — H&P PST ADULT - HISTORY OF PRESENT ILLNESS
Pt. is a 34 yo pregnant female with an incompetent cervix.  Pt. had placement of a cerclage 9/16/19.  Pt. states "it's still open the cervix."

## 2019-09-26 ENCOUNTER — OUTPATIENT (OUTPATIENT)
Dept: INPATIENT UNIT | Facility: HOSPITAL | Age: 34
LOS: 1 days | Discharge: HOME | End: 2019-09-26

## 2019-09-26 ENCOUNTER — TRANSCRIPTION ENCOUNTER (OUTPATIENT)
Age: 34
End: 2019-09-26

## 2019-09-26 VITALS — SYSTOLIC BLOOD PRESSURE: 127 MMHG | HEART RATE: 77 BPM | DIASTOLIC BLOOD PRESSURE: 59 MMHG

## 2019-09-26 VITALS
SYSTOLIC BLOOD PRESSURE: 118 MMHG | TEMPERATURE: 98 F | OXYGEN SATURATION: 100 % | RESPIRATION RATE: 18 BRPM | DIASTOLIC BLOOD PRESSURE: 46 MMHG | HEART RATE: 85 BPM

## 2019-09-26 DIAGNOSIS — N88.3 INCOMPETENCE OF CERVIX UTERI: ICD-10-CM

## 2019-09-26 DIAGNOSIS — Z98.890 OTHER SPECIFIED POSTPROCEDURAL STATES: Chronic | ICD-10-CM

## 2019-09-26 DIAGNOSIS — O34.30 MATERNAL CARE FOR CERVICAL INCOMPETENCE, UNSPECIFIED TRIMESTER: ICD-10-CM

## 2019-09-26 RX ORDER — INFLUENZA VIRUS VACCINE 15; 15; 15; 15 UG/.5ML; UG/.5ML; UG/.5ML; UG/.5ML
0.5 SUSPENSION INTRAMUSCULAR ONCE
Refills: 0 | Status: DISCONTINUED | OUTPATIENT
Start: 2019-09-26 | End: 2019-09-26

## 2019-09-26 RX ORDER — CITRIC ACID/SODIUM CITRATE 300-500 MG
30 SOLUTION, ORAL ORAL ONCE
Refills: 0 | Status: COMPLETED | OUTPATIENT
Start: 2019-09-26 | End: 2019-09-26

## 2019-09-26 RX ORDER — FAMOTIDINE 10 MG/ML
20 INJECTION INTRAVENOUS ONCE
Refills: 0 | Status: COMPLETED | OUTPATIENT
Start: 2019-09-26 | End: 2019-09-26

## 2019-09-26 RX ORDER — SODIUM CHLORIDE 9 MG/ML
1000 INJECTION, SOLUTION INTRAVENOUS ONCE
Refills: 0 | Status: COMPLETED | OUTPATIENT
Start: 2019-09-26 | End: 2019-09-26

## 2019-09-26 RX ORDER — SODIUM CHLORIDE 9 MG/ML
1000 INJECTION, SOLUTION INTRAVENOUS
Refills: 0 | Status: DISCONTINUED | OUTPATIENT
Start: 2019-09-26 | End: 2019-09-26

## 2019-09-26 RX ORDER — INDOMETHACIN 50 MG
50 CAPSULE ORAL ONCE
Refills: 0 | Status: COMPLETED | OUTPATIENT
Start: 2019-09-26 | End: 2019-09-26

## 2019-09-26 RX ORDER — METOCLOPRAMIDE HCL 10 MG
10 TABLET ORAL ONCE
Refills: 0 | Status: COMPLETED | OUTPATIENT
Start: 2019-09-26 | End: 2019-09-26

## 2019-09-26 RX ORDER — INDOMETHACIN 50 MG
1 CAPSULE ORAL
Qty: 8 | Refills: 0
Start: 2019-09-26 | End: 2019-09-27

## 2019-09-26 RX ORDER — INDOMETHACIN 50 MG
25 CAPSULE ORAL EVERY 6 HOURS
Refills: 0 | Status: DISCONTINUED | OUTPATIENT
Start: 2019-09-26 | End: 2019-09-26

## 2019-09-26 RX ADMIN — FAMOTIDINE 20 MILLIGRAM(S): 10 INJECTION INTRAVENOUS at 07:21

## 2019-09-26 RX ADMIN — Medication 25 MILLIGRAM(S): at 15:16

## 2019-09-26 RX ADMIN — Medication 30 MILLILITER(S): at 07:21

## 2019-09-26 RX ADMIN — SODIUM CHLORIDE 125 MILLILITER(S): 9 INJECTION, SOLUTION INTRAVENOUS at 07:22

## 2019-09-26 RX ADMIN — Medication 50 MILLIGRAM(S): at 15:26

## 2019-09-26 RX ADMIN — Medication 50 MILLIGRAM(S): at 08:15

## 2019-09-26 RX ADMIN — SODIUM CHLORIDE 2000 MILLILITER(S): 9 INJECTION, SOLUTION INTRAVENOUS at 07:21

## 2019-09-26 RX ADMIN — Medication 10 MILLIGRAM(S): at 07:21

## 2019-09-26 RX ADMIN — Medication 25 MILLIGRAM(S): at 15:25

## 2019-09-26 NOTE — DISCHARGE NOTE ANTEPARTUM - MEDICATION SUMMARY - MEDICATIONS TO TAKE
I will START or STAY ON the medications listed below when I get home from the hospital:    indomethacin 25 mg oral capsule  -- 1 cap(s) by mouth every 6 hours MDD:4  -- Do not take aspirin or aspirin containing products without knowledge and consent of your physician.  It is very important that you take or use this exactly as directed.  Do not skip doses or discontinue unless directed by your doctor.  May cause drowsiness or dizziness.  Obtain medical advice before taking any non-prescription drugs as some may affect the action of this medication.  Take with food or milk.    -- Indication: For Contractions    Prenatal Multivitamins with Folic Acid 1 mg oral tablet  -- 1 tab(s) by mouth once a day  -- Indication: For supplement    progesterone 200 mg oral capsule  -- 1 cap(s) by mouth once a day (at bedtime)  -- Indication: For short cervix

## 2019-09-26 NOTE — BRIEF OPERATIVE NOTE - NSICDXBRIEFPREOP_GEN_ALL_CORE_FT
PRE-OP DIAGNOSIS:  Cervical insufficiency in pregnancy in second trimester, antepartum 26-Sep-2019 09:30:39  Dane Rivas

## 2019-09-26 NOTE — BRIEF OPERATIVE NOTE - NSICDXBRIEFPOSTOP_GEN_ALL_CORE_FT
POST-OP DIAGNOSIS:  Cervical insufficiency in pregnancy in second trimester, antepartum 26-Sep-2019 09:30:52  Dane Rivas

## 2019-09-26 NOTE — DISCHARGE NOTE ANTEPARTUM - HOSPITAL COURSE
Pt admitted for cerclage revision. Second cerclage placed, uncomplicated.  post procedure. Pt to follow up with Dr. Jackson.

## 2019-09-26 NOTE — OB PROVIDER H&P - HISTORY OF PRESENT ILLNESS
32yo female   EDC/  presents@21 wks for scheduled cerclage revision due to continue cervix shortening.  +AP course cerclage placement, 19, otherwise unremarkable.   Denies VB,ROM or UCs today.  +FM

## 2019-09-26 NOTE — DISCHARGE NOTE ANTEPARTUM - PLAN OF CARE
wellness pt with take indocin x48h, and then she will follow up with Dr. Jackson in the office next week.

## 2019-09-26 NOTE — DISCHARGE NOTE ANTEPARTUM - CARE PROVIDER_API CALL
Ariel Jackson)  MaternalFetal Medicine; Obstetrics and Gynecology  [address] [address] [place] [address], Room [address]  [place] [address]  Phone: [phone]  Fax: [phone]  Follow Up Time:

## 2019-09-26 NOTE — OB PROVIDER H&P - NSHPPHYSICALEXAM_GEN_ALL_CORE
T(C): 37 (09-26-19 @ 07:10), Max: 37 (09-26-19 @ 07:10)  HR: 77 (09-26-19 @ 07:10) (68 - 77)  BP: 127/59 (09-26-19 @ 07:10) (110/72 - 127/59)  RR: 18 (09-26-19 @ 07:10) (14 - 18)  SpO2: 99% (09-25-19 @ 16:13) (99% - 99%)Height (cm): 162.56 (09-26-19 @ 07:10), 160.02 (09-25-19 @ 16:41)  Weight (kg): 78 (09-26-19 @ 07:10), 77.6 (09-25-19 @ 16:41)  BMI (kg/m2): 29.5 (09-26-19 @ 07:10), 30.3 (09-25-19 @ 16:41)  BSA (m2): 1.83 (09-26-19 @ 07:10), 1.81 (09-25-19 @ 16:41)    A&Ox3  Heart: RRR, S1&S2, no S3  Lungs: Clear bilateral to auscultation, good inspiratory /expiratory effort              no rhonchi, no rales  Ext:  FROM / no edema/minimal edema /  1+,2+ pitting edema  Neuro: grossly intact T(C): 37 (09-26-19 @ 07:10), Max: 37 (09-26-19 @ 07:10)  HR: 77 (09-26-19 @ 07:10) (68 - 77)  BP: 127/59 (09-26-19 @ 07:10) (110/72 - 127/59)  RR: 18 (09-26-19 @ 07:10) (14 - 18)  SpO2: 99% (09-25-19 @ 16:13) (99% - 99%)Height (cm): 162.56 (09-26-19 @ 07:10), 160.02 (09-25-19 @ 16:41)  Weight (kg): 78 (09-26-19 @ 07:10), 77.6 (09-25-19 @ 16:41)  BMI (kg/m2): 29.5 (09-26-19 @ 07:10), 30.3 (09-25-19 @ 16:41)  BSA (m2): 1.83 (09-26-19 @ 07:10), 1.81 (09-25-19 @ 16:41)    A&Ox3  Heart: RRR, S1&S2, no S3  Lungs: Clear bilateral to auscultation, good inspiratory /expiratory effort              no rhonchi, no rales  Abd:    +FH, +FM  on Bedside sono  Ext:  FROM / no edema/minimal edema /  1+,2+ pitting edema  Neuro: grossly intact

## 2019-09-26 NOTE — DISCHARGE NOTE ANTEPARTUM - PATIENT PORTAL LINK FT
You can access the FollowMyHealth Patient Portal offered by [facility] by registering at the following website: [email or web address]. By joining [employer]’s FollowMyHealth portal, you will also be able to view your health information using other applications (apps) compatible with our system.

## 2019-09-26 NOTE — OB PROVIDER H&P - ASSESSMENT
Admit to L&D  avhid cerclage placement  NPO  routine labs  anesthesia consult  Evelin Gomes PAC  09-26-19 @ 07:22

## 2019-09-26 NOTE — DISCHARGE NOTE ANTEPARTUM - CARE PLAN
Principal Discharge DX:	Cervical incompetence  Goal:	wellness  Assessment and plan of treatment:	pt with take indocin x48h, and then she will follow up with Dr. Jackson in the office next week.

## 2019-09-26 NOTE — OB PROVIDER H&P - NSHPREVIEWOFSYSTEMS_GEN_ALL_CORE
A&Ox3  CARDIOVASCULAR:  denies murmurs or h/o cardio myopathy, denies palpitations, chest pain or LOC  : No VB, no ROM

## 2019-10-17 ENCOUNTER — APPOINTMENT (OUTPATIENT)
Dept: ANTEPARTUM | Facility: CLINIC | Age: 34
End: 2019-10-17
Payer: COMMERCIAL

## 2019-10-17 PROCEDURE — 76817 TRANSVAGINAL US OBSTETRIC: CPT

## 2019-11-02 ENCOUNTER — APPOINTMENT (OUTPATIENT)
Dept: ANTEPARTUM | Facility: CLINIC | Age: 34
End: 2019-11-02
Payer: COMMERCIAL

## 2019-11-02 PROCEDURE — 76817 TRANSVAGINAL US OBSTETRIC: CPT

## 2019-11-30 ENCOUNTER — APPOINTMENT (OUTPATIENT)
Dept: ANTEPARTUM | Facility: CLINIC | Age: 34
End: 2019-11-30

## 2019-12-07 ENCOUNTER — APPOINTMENT (OUTPATIENT)
Dept: ANTEPARTUM | Facility: CLINIC | Age: 34
End: 2019-12-07
Payer: COMMERCIAL

## 2019-12-07 PROCEDURE — 76819 FETAL BIOPHYS PROFIL W/O NST: CPT

## 2019-12-07 PROCEDURE — 76816 OB US FOLLOW-UP PER FETUS: CPT

## 2019-12-07 PROCEDURE — 76818 FETAL BIOPHYS PROFILE W/NST: CPT

## 2019-12-07 PROCEDURE — 0502F SUBSEQUENT PRENATAL CARE: CPT

## 2019-12-07 PROCEDURE — ZZZZZ: CPT

## 2020-01-04 ENCOUNTER — APPOINTMENT (OUTPATIENT)
Dept: OBGYN | Facility: CLINIC | Age: 35
End: 2020-01-04
Payer: COMMERCIAL

## 2020-01-04 PROCEDURE — 0502F SUBSEQUENT PRENATAL CARE: CPT

## 2020-01-13 ENCOUNTER — APPOINTMENT (OUTPATIENT)
Dept: OBGYN | Facility: CLINIC | Age: 35
End: 2020-01-13

## 2020-01-21 ENCOUNTER — APPOINTMENT (OUTPATIENT)
Dept: OBGYN | Facility: CLINIC | Age: 35
End: 2020-01-21

## 2020-01-27 ENCOUNTER — TRANSCRIPTION ENCOUNTER (OUTPATIENT)
Age: 35
End: 2020-01-27

## 2020-01-27 ENCOUNTER — APPOINTMENT (OUTPATIENT)
Dept: ANTEPARTUM | Facility: CLINIC | Age: 35
End: 2020-01-27
Payer: COMMERCIAL

## 2020-01-27 PROCEDURE — 76816 OB US FOLLOW-UP PER FETUS: CPT

## 2020-01-27 PROCEDURE — 76819 FETAL BIOPHYS PROFIL W/O NST: CPT

## 2020-01-28 ENCOUNTER — OUTPATIENT (OUTPATIENT)
Dept: INPATIENT UNIT | Facility: HOSPITAL | Age: 35
LOS: 1 days | Discharge: HOME | End: 2020-01-28

## 2020-01-28 VITALS — DIASTOLIC BLOOD PRESSURE: 63 MMHG | SYSTOLIC BLOOD PRESSURE: 133 MMHG | HEART RATE: 85 BPM

## 2020-01-28 VITALS
DIASTOLIC BLOOD PRESSURE: 66 MMHG | SYSTOLIC BLOOD PRESSURE: 126 MMHG | TEMPERATURE: 98 F | RESPIRATION RATE: 25 BRPM | HEART RATE: 85 BPM | OXYGEN SATURATION: 100 %

## 2020-01-28 DIAGNOSIS — O34.30 MATERNAL CARE FOR CERVICAL INCOMPETENCE, UNSPECIFIED TRIMESTER: ICD-10-CM

## 2020-01-28 DIAGNOSIS — Z98.890 OTHER SPECIFIED POSTPROCEDURAL STATES: Chronic | ICD-10-CM

## 2020-01-28 LAB
BLD GP AB SCN SERPL QL: NEGATIVE — SIGNIFICANT CHANGE UP
HCT VFR BLD CALC: 36.4 % — SIGNIFICANT CHANGE UP (ref 34.5–45)
HGB BLD-MCNC: 11.7 G/DL — SIGNIFICANT CHANGE UP (ref 11.5–15.5)
MCHC RBC-ENTMCNC: 29.8 PG — SIGNIFICANT CHANGE UP (ref 27–34)
MCHC RBC-ENTMCNC: 32.1 % — SIGNIFICANT CHANGE UP (ref 32–36)
MCV RBC AUTO: 92.9 FL — SIGNIFICANT CHANGE UP (ref 80–100)
NRBC # FLD: 0 K/UL — SIGNIFICANT CHANGE UP (ref 0–0)
PLATELET # BLD AUTO: 318 K/UL — SIGNIFICANT CHANGE UP (ref 150–400)
PMV BLD: 8.8 FL — SIGNIFICANT CHANGE UP (ref 7–13)
RBC # BLD: 3.92 M/UL — SIGNIFICANT CHANGE UP (ref 3.8–5.2)
RBC # FLD: 12.7 % — SIGNIFICANT CHANGE UP (ref 10.3–14.5)
RH IG SCN BLD-IMP: POSITIVE — SIGNIFICANT CHANGE UP
WBC # BLD: 7.46 K/UL — SIGNIFICANT CHANGE UP (ref 3.8–10.5)
WBC # FLD AUTO: 7.46 K/UL — SIGNIFICANT CHANGE UP (ref 3.8–10.5)

## 2020-01-28 RX ORDER — FAMOTIDINE 10 MG/ML
20 INJECTION INTRAVENOUS ONCE
Refills: 0 | Status: COMPLETED | OUTPATIENT
Start: 2020-01-28 | End: 2020-01-28

## 2020-01-28 RX ORDER — CITRIC ACID/SODIUM CITRATE 300-500 MG
30 SOLUTION, ORAL ORAL ONCE
Refills: 0 | Status: COMPLETED | OUTPATIENT
Start: 2020-01-28 | End: 2020-01-28

## 2020-01-28 RX ORDER — INFLUENZA VIRUS VACCINE 15; 15; 15; 15 UG/.5ML; UG/.5ML; UG/.5ML; UG/.5ML
0.5 SUSPENSION INTRAMUSCULAR ONCE
Refills: 0 | Status: DISCONTINUED | OUTPATIENT
Start: 2020-01-28 | End: 2020-01-28

## 2020-01-28 RX ORDER — METOCLOPRAMIDE HCL 10 MG
10 TABLET ORAL ONCE
Refills: 0 | Status: COMPLETED | OUTPATIENT
Start: 2020-01-28 | End: 2020-01-28

## 2020-01-28 RX ORDER — SODIUM CHLORIDE 9 MG/ML
1000 INJECTION, SOLUTION INTRAVENOUS
Refills: 0 | Status: DISCONTINUED | OUTPATIENT
Start: 2020-01-28 | End: 2020-01-28

## 2020-01-28 RX ORDER — PROGESTERONE 200 MG/1
1 CAPSULE, LIQUID FILLED ORAL
Qty: 0 | Refills: 0 | DISCHARGE

## 2020-01-28 RX ADMIN — SODIUM CHLORIDE 125 MILLILITER(S): 9 INJECTION, SOLUTION INTRAVENOUS at 09:44

## 2020-01-28 RX ADMIN — Medication 10 MILLIGRAM(S): at 09:45

## 2020-01-28 RX ADMIN — FAMOTIDINE 20 MILLIGRAM(S): 10 INJECTION INTRAVENOUS at 09:45

## 2020-01-28 RX ADMIN — Medication 30 MILLILITER(S): at 09:49

## 2020-01-28 NOTE — OB PROVIDER H&P - NSHPPHYSICALEXAM_GEN_ALL_CORE
A&Ox3  Heart: RRR, S1&S2, no S3  Lungs: Clear bilateral to auscultation, good inspiratory /expiratory effort              no rhonchi, no rales  Abd: soft, Gravid, TOCO in place           no scar  EFM:  xxxxx bpm/moderate variabilty/+accelerations/no decelerations/CAT 1  TOCO:  no UC  Ext:  FROM /minimal edema   Neuro: grossly intact A&Ox3  Heart: RRR, S1&S2, no S3  Lungs: Clear bilateral to auscultation, good inspiratory /expiratory effort              no rhonchi, no rales  Abd: soft, Gravid, TOCO in place           no scar  Ext:  FROM /minimal edema   Neuro: grossly intact

## 2020-01-28 NOTE — BRIEF OPERATIVE NOTE - OPERATION/FINDINGS
Cervix dilated to approximately 1cm with cerclage in place. 2 romeliakar cerclages in place and removed. Cervix dilated to 3/50/-3 after removal.

## 2020-01-28 NOTE — OB PROVIDER H&P - ATTENDING COMMENTS
OB Attending    P2 at 38w6d presenting for removal of Shirodkar cerclage x2. Procedure to be done under regional anesthesia. Consent obtained    LIAM Cui MD

## 2020-01-28 NOTE — OB PROVIDER H&P - NSRISKFACTORS_OBGYN_ALL_OB
Reason For Visit  PEMA VERGARA is a(n) established patient here today for follow-up management of SAMRA . A chaperone is not applicable. He is accompanied by no one.   :  services not used.        Quality    Adult Wellness CI height documented, pain scale level reviewed and has not fallen within the last 12 months.      History of Present Illness  57 yo male with chronic left sided neck pain. h/o C5-C7 fusion and L3-L5 fusion. s/p SAMRA with 90% relief, most numbness/tingling resolved after injection. Pt uses zanaflex prn at night without side effects. c/o right sided muscle tightness in lower spine x 2-3 days.      Review of Systems    A 10 point ROS reviewed and is negative except as noted above or in the HPI.      Current Meds   1. Flomax 0.4 MG Oral Capsule;   Therapy: (Recorded:11Jun2018) to Recorded   2. HydroCHLOROthiazide 12.5 MG Oral Capsule;   Therapy: (Recorded:11Jun2018) to Recorded   3. TiZANidine HCl - 4 MG Oral Tablet; TAKE 1/2 TO 1 TABLET BY MOUTH TWICE DAILY AS   NEEDED FOR MUSCLE SPASMS OR TIGHTNESS;   Therapy: 17Jan2018 to (Evaluate:08Sep2018)  Requested for: [id number]; Last   Rx:[id number] Ordered    Active Problems  Cervical radiculopathy (M54.12)  Facet arthropathy, cervical (M46.92)    Past Medical History  History of benign prostatic hyperplasia (Z87.438)  History of hypertension (Z86.79)    Surgical History  History of Cervical Vertebral Fusion  History of Lumbar Vertebral Fusion    Family History  Family history of cerebrovascular accident (CVA) (Z82.3)  Family history of hypercholesterolemia (Z83.42)    Social History  Never smoker  No alcohol use    Review  Past medical history, problem list, family history, surgical history and social history reviewed.      Vitals  Signs   Recorded: 24Sep2018 04:42PM   Height: 6 ft 1 in  Weight: 238 lb   BMI Calculated: 31.4  BSA Calculated: 2.32  Systolic: 116, RUE, Sitting  Diastolic: 80, RUE, Sitting  Heart Rate: 88  Pulse Quality:  Normal  Respiration Quality: Normal  Respiration: 18  O2 Saturation: 98  Pain Scale: 04    Physical Exam  Constitutional: alert, in no acute distress and current vital signs reviewed.   Head and Face: atraumatic, normocephalic.   Neck: normal appearing neck and supple neck.   Lymphatic: no lymphadenopathy.   Pulmonary: no respiratory distress and normal respiratory rate and effort.   Musculoskeletal:       Cervical Spine: Cervical Spine: Appearance: no swelling. Tenderness: not the cervical spine, not the left paraspinal, not the left trapezius muscle, not the right paraspinal and not the right trapezius muscle. Flexion was not restricted and was painless. Extension was not restricted and was painless. Rotation to the left was painless. Rotation to the right was painless. Motor: Normal.   Lumbosacral Spine: Lumbosacral Spine: Well healed incision. Tenderness: not the lumbar spine, not the left paraspinal and not the right paraspinal. + right latissimurs dorsi tightness. Flexion was restricted and was painless. Extension was restricted and was painless. Rotation to the left was painless. Rotation to the right was painless. Special Tests: negative Straight Leg Raise.   Neurological: cranial nerves 2-12 intact. no sensory deficits noted. normal gait. Right upper extremity: strength 5/5 Left upper extremity: strength 5/5   Psychiatric: alert and awake, interactive and mood/affect were appropriate.      Assessment  Facet arthropathy, cervical (M46.92)  DDD (degenerative disc disease), cervical (M50.30)  Low back pain (M54.5)    Plan  TiZANidine HCl - 4 MG Oral Tablet; TAKE 1/2 TO 1 TABLET BY MOUTH TWICE  DAILY AS NEEDED FOR MUSCLE SPASMS OR TIGHTNESS      1. refill zanaflex  2. SAMRA can be repeated in future if needed  3. discussed potential referral to PT if low back pain persists- he will call office if needed  4. f/u prn   Check [place] Prescription Monitoring Website.    Answered all patients questions regarding  treatment plan today.      Signatures   Electronically signed by : Juan Antonio Alvarez CMA; Sep 24 2018  4:43PM CST    Electronically signed by : HANNA ALEXANDER; Sep 24 2018  5:58PM CST     Yes

## 2020-01-28 NOTE — ASU DISCHARGE PLAN (ADULT/PEDIATRIC) - CALL YOUR DOCTOR IF YOU HAVE ANY OF THE FOLLOWING:
Increased irritability or sluggishness/Numbness, tingling, color or temperature change to extremity/Swelling that gets worse/Fever greater than (need to indicate Fahrenheit or Celsius)/Bleeding that does not stop/Unable to urinate/Nausea and vomiting that does not stop/Excessive diarrhea/Pain not relieved by Medications/Wound/Surgical Site with redness, or foul smelling discharge or pus/Inability to tolerate liquids or foods

## 2020-01-28 NOTE — OB RN PATIENT PROFILE - BREAST MILK SUPPORTS STABLE NEWBORN BLOOD SUGAR
XEROSIS (DRY SKIN)        1. Definition    Xerosis is the term for dry skin.  We all have a natural oil coating over our skin produced by the skin oil glands.  If this oil is removed, the skin becomes dry which can lead to cracking, which can lead to inflammation.  Xerosis is usually a long-term problem that recurs often, especially in the winter.    2. Cause     Long hot baths or showers can remove our natural oil and lead to xerosis.  One should never take more than one bath or shower a day and for no longer than ten minutes.   Use of harsh soaps such as Zest, Dial, and Ivory can worsen and cause xerosis.   Cold winter weather worsens xerosis because the amount of moisture contained in cold air is much less than the amount of moisture in warm air.    3. Treatment     Treatment is intended to restore the natural oil to your skin.  Keep the skin lubricated.     Do not take more than one bath or shower a day.  Use lukewarm water, not hot.  Hot water dries out the skin.     Use a gentle moisturizing soap such as Cetaphil soap, Oil of Olay, Dove, Basis, Ivory moisture care, Restoraderm cleanser.     When toweling dry, dont rub.  Blot the skin so there is still some water left on the skin.  You should apply a moisturizing cream to all of the skin such as Cerave cream, Cetaphil cream, Restoraderm or Eucerin Original Formula cream.   Alpha hydroxyacid lotions, i.e., AmLactin, also work very well for preventing dry skin, but may burn when used on inflamed or reddened skin.     If you like to swim during the winter months, you should not use soap when getting out of the pool.  When you have finished swimming, rinse off the chlorine with cool to warm water.  If this will be the only shower of the day, then you may use Cetaphil or another mild soap to cleanse your skin.  After the shower, apply a moisturizing cream to all of the skin as above.          
Statement Selected

## 2020-01-28 NOTE — OB PROVIDER H&P - HISTORY OF PRESENT ILLNESS
35yo female   EDC 2020  presents@ 38.6 wks for scheduled cerclage removal . Fuentes cerclage placed 2019, cervix still found to be dilated, Ken was placed 19.  +AP course progesterone use , otherwise unremarkable.   Denies VB,ROM or UCs today.  +FM

## 2020-01-28 NOTE — OB PROVIDER H&P - NS_OBGYNHISTORY_OBGYN_ALL_OB_FT
OBHx:             2018- NVD- male;7.8#            2017- sAB @?20wks- D&C            2014-NVD- male;6.10#

## 2020-01-28 NOTE — OB PROVIDER H&P - NSHPLABSRESULTS_GEN_ALL_CORE
Type + Screen (09.25.19 @ 15:45)    ABO Interpretation: A    Rh Interpretation: Positive    Antibody Screen: Negative

## 2020-01-28 NOTE — ASU DISCHARGE PLAN (ADULT/PEDIATRIC) - CARE PROVIDER_API CALL
Liz Martinez; MPH)  Renetta MICHAEL  [address] [facility], [address]  [place] [address]  Phone: [phone]  Fax: [phone]  Follow Up Time:

## 2020-01-28 NOTE — OB PROVIDER H&P - ASSESSMENT
Admit to L&D  Sample,MD-Service  Dx: cervical insufficiency  Dx: Ken Cerclage removal  NPO  routine labs  EFM/TOCO  possible anesthesia consult  Evelin Gomes PAC  01-28-20 @ 08:30

## 2020-02-03 ENCOUNTER — APPOINTMENT (OUTPATIENT)
Dept: OBGYN | Facility: CLINIC | Age: 35
End: 2020-02-03

## 2020-02-07 ENCOUNTER — OUTPATIENT (OUTPATIENT)
Dept: INPATIENT UNIT | Facility: HOSPITAL | Age: 35
LOS: 1 days | Discharge: HOME | End: 2020-02-07
Payer: COMMERCIAL

## 2020-02-07 VITALS — HEART RATE: 78 BPM | SYSTOLIC BLOOD PRESSURE: 127 MMHG | DIASTOLIC BLOOD PRESSURE: 61 MMHG

## 2020-02-07 VITALS
DIASTOLIC BLOOD PRESSURE: 61 MMHG | SYSTOLIC BLOOD PRESSURE: 127 MMHG | RESPIRATION RATE: 14 BRPM | TEMPERATURE: 99 F | HEART RATE: 78 BPM

## 2020-02-07 DIAGNOSIS — Z98.890 OTHER SPECIFIED POSTPROCEDURAL STATES: Chronic | ICD-10-CM

## 2020-02-07 DIAGNOSIS — Z3A.00 WEEKS OF GESTATION OF PREGNANCY NOT SPECIFIED: ICD-10-CM

## 2020-02-07 DIAGNOSIS — O26.899 OTHER SPECIFIED PREGNANCY RELATED CONDITIONS, UNSPECIFIED TRIMESTER: ICD-10-CM

## 2020-02-07 PROCEDURE — 99214 OFFICE O/P EST MOD 30 MIN: CPT | Mod: 25

## 2020-02-07 PROCEDURE — 59025 FETAL NON-STRESS TEST: CPT | Mod: 26

## 2020-02-07 PROCEDURE — 76818 FETAL BIOPHYS PROFILE W/NST: CPT | Mod: 26

## 2020-02-07 NOTE — OB PROVIDER TRIAGE NOTE - NSHPPHYSICALEXAM_GEN_ALL_CORE
abdomen soft, nontender  sve 3/60/-3/I  nst reactive  toco: ctx q 2-3 mins abdomen soft, nontender  taus: sliup, cephalic presentation, posterior placenta, bpp 8/8, blanche 28, efw 3320 grams  sve 3/60/-3/I  nst reactive  toco: ctx q 2-3 mins

## 2020-02-07 NOTE — OB PROVIDER TRIAGE NOTE - HISTORY OF PRESENT ILLNESS
34 y.o. Black patient  DEXTER 2020 @ 40.2 weeks presents with c/o abdominal "tightening" 34 y.o. Black patient  DEXTER 2020 @ 40.2 weeks presents with c/o abdominal "tightening" pt unsure of interval. Pt denies LOF, VB. States +FM and uncomplicated antepartum course. Pt is not yet scheduled for IOL.

## 2020-02-07 NOTE — OB PROVIDER TRIAGE NOTE - NSOBPROVIDERNOTE_OBGYN_ALL_OB_FT
34 y.o. Black patient  DEXTER 2020 @ 40.2 weeks presents with c/o abdominal "tightening" pt unsure of interval. Pt denies LOF, VB. States +FM and uncomplicated antepartum course. Pt is not yet scheduled for IOL.    GBS positive  allergies:  NKDA  medications:  prenatal vitamins    med/surg hx:  d+c x 1  OBGYN hx:  2014  6lbs 10 oz   sab with d+c  x 1  2018  7lbs 10 oz    abdomen soft, nontender  taus:   sve 360/-3/I  nst reactive  toco: ctx q 2-3 mins    a/p:  34 y.o. Black patient  DEXTER 2020 @ 40.2 weeks in early labor 34 y.o. Black patient  DEXTER 2020 @ 40.2 weeks presents with c/o abdominal "tightening" pt unsure of interval. Pt denies LOF, VB. States +FM and uncomplicated antepartum course. Pt is not yet scheduled for IOL.    GBS positive  allergies:  NKDA  medications:  prenatal vitamins    med/surg hx:  d+c x 1  OBGYN hx:  2014  6lbs 10 oz   sab with d+c  x 1  2018  7lbs 10 oz    abdomen soft, nontender  taus: sliup, cephalic presentation, posterior placenta, bpp , blanche 28, efw 3320 grams  sve 3/60/-3/I  nst reactive  toco: ctx q 2-3 mins    a/p:  34 y.o. Black patient  DEXTER 2020 @ 40.2 weeks in early labor    Discussed with Dr Wiggins . 34 y.o. Black patient  DEXTER 2020 @ 40.2 weeks presents with c/o abdominal "tightening" pt unsure of interval. Pt denies LOF, VB. States +FM and uncomplicated antepartum course. Pt is not yet scheduled for IOL.    GBS positive  allergies:  NKDA  medications:  prenatal vitamins    med/surg hx:  d+c x 1  OBGYN hx:  2014  6lbs 10 oz   sab with d+c  x 1  2018  7lbs 10 oz    abdomen soft, nontender  taus: sliup, cephalic presentation, posterior placenta, bpp /, blanche 28, efw 3320 grams  sve 3/60/-3/I  nst reactive  toco: ctx q 2-3 mins    a/p:  34 y.o. Black patient  DEXTER 2020 @ 40.2 weeks in early labor    Discussed with Dr Wiggins. Pt for discharge home. Labor precautions/fetal kick counts reviewed. Encouraged increased PO hydration. F/U next scheduled prenatal appointment.    Celeste, NP

## 2020-02-10 ENCOUNTER — APPOINTMENT (OUTPATIENT)
Dept: ANTEPARTUM | Facility: CLINIC | Age: 35
End: 2020-02-10
Payer: COMMERCIAL

## 2020-02-10 ENCOUNTER — APPOINTMENT (OUTPATIENT)
Dept: OBGYN | Facility: CLINIC | Age: 35
End: 2020-02-10

## 2020-02-10 PROCEDURE — 76819 FETAL BIOPHYS PROFIL W/O NST: CPT

## 2020-02-10 PROCEDURE — 76816 OB US FOLLOW-UP PER FETUS: CPT

## 2020-02-13 ENCOUNTER — APPOINTMENT (OUTPATIENT)
Dept: OBGYN | Facility: CLINIC | Age: 35
End: 2020-02-13
Payer: COMMERCIAL

## 2020-02-13 PROCEDURE — 76818 FETAL BIOPHYS PROFILE W/NST: CPT

## 2020-02-14 ENCOUNTER — INPATIENT (INPATIENT)
Facility: HOSPITAL | Age: 35
LOS: 2 days | Discharge: HOME | End: 2020-02-17
Attending: OBSTETRICS & GYNECOLOGY | Admitting: OBSTETRICS & GYNECOLOGY
Payer: COMMERCIAL

## 2020-02-14 DIAGNOSIS — Z98.890 OTHER SPECIFIED POSTPROCEDURAL STATES: Chronic | ICD-10-CM

## 2020-02-14 DIAGNOSIS — Z3A.00 WEEKS OF GESTATION OF PREGNANCY NOT SPECIFIED: ICD-10-CM

## 2020-02-14 DIAGNOSIS — O26.899 OTHER SPECIFIED PREGNANCY RELATED CONDITIONS, UNSPECIFIED TRIMESTER: ICD-10-CM

## 2020-02-15 VITALS — SYSTOLIC BLOOD PRESSURE: 127 MMHG | HEART RATE: 68 BPM | DIASTOLIC BLOOD PRESSURE: 61 MMHG

## 2020-02-15 DIAGNOSIS — Z3A.41 41 WEEKS GESTATION OF PREGNANCY: ICD-10-CM

## 2020-02-15 LAB
BASOPHILS # BLD AUTO: 0.04 K/UL — SIGNIFICANT CHANGE UP (ref 0–0.2)
BASOPHILS NFR BLD AUTO: 0.6 % — SIGNIFICANT CHANGE UP (ref 0–2)
BLD GP AB SCN SERPL QL: NEGATIVE — SIGNIFICANT CHANGE UP
EOSINOPHIL # BLD AUTO: 0.05 K/UL — SIGNIFICANT CHANGE UP (ref 0–0.5)
EOSINOPHIL NFR BLD AUTO: 0.8 % — SIGNIFICANT CHANGE UP (ref 0–6)
HCT VFR BLD CALC: 36.5 % — SIGNIFICANT CHANGE UP (ref 34.5–45)
HGB BLD-MCNC: 11.4 G/DL — LOW (ref 11.5–15.5)
IMM GRANULOCYTES NFR BLD AUTO: 0.6 % — SIGNIFICANT CHANGE UP (ref 0–1.5)
LYMPHOCYTES # BLD AUTO: 1.72 K/UL — SIGNIFICANT CHANGE UP (ref 1–3.3)
LYMPHOCYTES # BLD AUTO: 26.1 % — SIGNIFICANT CHANGE UP (ref 13–44)
MCHC RBC-ENTMCNC: 29.7 PG — SIGNIFICANT CHANGE UP (ref 27–34)
MCHC RBC-ENTMCNC: 31.2 % — LOW (ref 32–36)
MCV RBC AUTO: 95.1 FL — SIGNIFICANT CHANGE UP (ref 80–100)
MONOCYTES # BLD AUTO: 0.6 K/UL — SIGNIFICANT CHANGE UP (ref 0–0.9)
MONOCYTES NFR BLD AUTO: 9.1 % — SIGNIFICANT CHANGE UP (ref 2–14)
NEUTROPHILS # BLD AUTO: 4.13 K/UL — SIGNIFICANT CHANGE UP (ref 1.8–7.4)
NEUTROPHILS NFR BLD AUTO: 62.8 % — SIGNIFICANT CHANGE UP (ref 43–77)
NRBC # FLD: 0 K/UL — SIGNIFICANT CHANGE UP (ref 0–0)
PLATELET # BLD AUTO: 313 K/UL — SIGNIFICANT CHANGE UP (ref 150–400)
PMV BLD: 9.5 FL — SIGNIFICANT CHANGE UP (ref 7–13)
RBC # BLD: 3.84 M/UL — SIGNIFICANT CHANGE UP (ref 3.8–5.2)
RBC # FLD: 12.4 % — SIGNIFICANT CHANGE UP (ref 10.3–14.5)
RH IG SCN BLD-IMP: POSITIVE — SIGNIFICANT CHANGE UP
WBC # BLD: 6.58 K/UL — SIGNIFICANT CHANGE UP (ref 3.8–10.5)
WBC # FLD AUTO: 6.58 K/UL — SIGNIFICANT CHANGE UP (ref 3.8–10.5)

## 2020-02-15 PROCEDURE — 59410 OBSTETRICAL CARE: CPT

## 2020-02-15 RX ORDER — MAGNESIUM HYDROXIDE 400 MG/1
30 TABLET, CHEWABLE ORAL
Refills: 0 | Status: DISCONTINUED | OUTPATIENT
Start: 2020-02-15 | End: 2020-02-17

## 2020-02-15 RX ORDER — HYDROCORTISONE 1 %
1 OINTMENT (GRAM) TOPICAL EVERY 6 HOURS
Refills: 0 | Status: DISCONTINUED | OUTPATIENT
Start: 2020-02-15 | End: 2020-02-17

## 2020-02-15 RX ORDER — AMPICILLIN TRIHYDRATE 250 MG
1 CAPSULE ORAL EVERY 4 HOURS
Refills: 0 | Status: DISCONTINUED | OUTPATIENT
Start: 2020-02-15 | End: 2020-02-15

## 2020-02-15 RX ORDER — OXYTOCIN 10 UNIT/ML
2 VIAL (ML) INJECTION
Qty: 30 | Refills: 0 | Status: DISCONTINUED | OUTPATIENT
Start: 2020-02-15 | End: 2020-02-15

## 2020-02-15 RX ORDER — OXYTOCIN 10 UNIT/ML
333.33 VIAL (ML) INJECTION
Qty: 20 | Refills: 0 | Status: DISCONTINUED | OUTPATIENT
Start: 2020-02-15 | End: 2020-02-15

## 2020-02-15 RX ORDER — PRAMOXINE HYDROCHLORIDE 150 MG/15G
1 AEROSOL, FOAM RECTAL EVERY 4 HOURS
Refills: 0 | Status: DISCONTINUED | OUTPATIENT
Start: 2020-02-15 | End: 2020-02-17

## 2020-02-15 RX ORDER — OXYCODONE HYDROCHLORIDE 5 MG/1
5 TABLET ORAL
Refills: 0 | Status: DISCONTINUED | OUTPATIENT
Start: 2020-02-15 | End: 2020-02-17

## 2020-02-15 RX ORDER — OXYTOCIN 10 UNIT/ML
333.33 VIAL (ML) INJECTION
Qty: 20 | Refills: 0 | Status: COMPLETED | OUTPATIENT
Start: 2020-02-15 | End: 2020-02-16

## 2020-02-15 RX ORDER — IBUPROFEN 200 MG
600 TABLET ORAL EVERY 6 HOURS
Refills: 0 | Status: COMPLETED | OUTPATIENT
Start: 2020-02-15 | End: 2021-01-13

## 2020-02-15 RX ORDER — AER TRAVELER 0.5 G/1
1 SOLUTION RECTAL; TOPICAL EVERY 4 HOURS
Refills: 0 | Status: DISCONTINUED | OUTPATIENT
Start: 2020-02-15 | End: 2020-02-17

## 2020-02-15 RX ORDER — OXYCODONE HYDROCHLORIDE 5 MG/1
5 TABLET ORAL ONCE
Refills: 0 | Status: DISCONTINUED | OUTPATIENT
Start: 2020-02-15 | End: 2020-02-17

## 2020-02-15 RX ORDER — INFLUENZA VIRUS VACCINE 15; 15; 15; 15 UG/.5ML; UG/.5ML; UG/.5ML; UG/.5ML
0.5 SUSPENSION INTRAMUSCULAR ONCE
Refills: 0 | Status: DISCONTINUED | OUTPATIENT
Start: 2020-02-15 | End: 2020-02-15

## 2020-02-15 RX ORDER — SODIUM CHLORIDE 9 MG/ML
1000 INJECTION, SOLUTION INTRAVENOUS
Refills: 0 | Status: DISCONTINUED | OUTPATIENT
Start: 2020-02-15 | End: 2020-02-15

## 2020-02-15 RX ORDER — DIBUCAINE 1 %
1 OINTMENT (GRAM) RECTAL EVERY 6 HOURS
Refills: 0 | Status: DISCONTINUED | OUTPATIENT
Start: 2020-02-15 | End: 2020-02-17

## 2020-02-15 RX ORDER — BENZOCAINE 10 %
1 GEL (GRAM) MUCOUS MEMBRANE EVERY 6 HOURS
Refills: 0 | Status: DISCONTINUED | OUTPATIENT
Start: 2020-02-15 | End: 2020-02-17

## 2020-02-15 RX ORDER — KETOROLAC TROMETHAMINE 30 MG/ML
30 SYRINGE (ML) INJECTION ONCE
Refills: 0 | Status: DISCONTINUED | OUTPATIENT
Start: 2020-02-15 | End: 2020-02-16

## 2020-02-15 RX ORDER — TETANUS TOXOID, REDUCED DIPHTHERIA TOXOID AND ACELLULAR PERTUSSIS VACCINE, ADSORBED 5; 2.5; 8; 8; 2.5 [IU]/.5ML; [IU]/.5ML; UG/.5ML; UG/.5ML; UG/.5ML
0.5 SUSPENSION INTRAMUSCULAR ONCE
Refills: 0 | Status: DISCONTINUED | OUTPATIENT
Start: 2020-02-15 | End: 2020-02-17

## 2020-02-15 RX ORDER — AMPICILLIN TRIHYDRATE 250 MG
2 CAPSULE ORAL ONCE
Refills: 0 | Status: COMPLETED | OUTPATIENT
Start: 2020-02-15 | End: 2020-02-15

## 2020-02-15 RX ORDER — CITRIC ACID/SODIUM CITRATE 300-500 MG
15 SOLUTION, ORAL ORAL EVERY 6 HOURS
Refills: 0 | Status: DISCONTINUED | OUTPATIENT
Start: 2020-02-15 | End: 2020-02-15

## 2020-02-15 RX ORDER — SODIUM CHLORIDE 9 MG/ML
3 INJECTION INTRAMUSCULAR; INTRAVENOUS; SUBCUTANEOUS EVERY 8 HOURS
Refills: 0 | Status: DISCONTINUED | OUTPATIENT
Start: 2020-02-15 | End: 2020-02-16

## 2020-02-15 RX ORDER — SIMETHICONE 80 MG/1
80 TABLET, CHEWABLE ORAL EVERY 4 HOURS
Refills: 0 | Status: DISCONTINUED | OUTPATIENT
Start: 2020-02-15 | End: 2020-02-17

## 2020-02-15 RX ORDER — GLYCERIN ADULT
1 SUPPOSITORY, RECTAL RECTAL AT BEDTIME
Refills: 0 | Status: DISCONTINUED | OUTPATIENT
Start: 2020-02-15 | End: 2020-02-17

## 2020-02-15 RX ORDER — DIPHENHYDRAMINE HCL 50 MG
25 CAPSULE ORAL EVERY 6 HOURS
Refills: 0 | Status: DISCONTINUED | OUTPATIENT
Start: 2020-02-15 | End: 2020-02-17

## 2020-02-15 RX ORDER — ACETAMINOPHEN 500 MG
975 TABLET ORAL
Refills: 0 | Status: DISCONTINUED | OUTPATIENT
Start: 2020-02-15 | End: 2020-02-17

## 2020-02-15 RX ORDER — LANOLIN
1 OINTMENT (GRAM) TOPICAL EVERY 6 HOURS
Refills: 0 | Status: DISCONTINUED | OUTPATIENT
Start: 2020-02-15 | End: 2020-02-17

## 2020-02-15 RX ADMIN — Medication 108 GRAM(S): at 22:31

## 2020-02-15 RX ADMIN — Medication 108 GRAM(S): at 14:29

## 2020-02-15 RX ADMIN — Medication 108 GRAM(S): at 18:22

## 2020-02-15 RX ADMIN — SODIUM CHLORIDE 125 MILLILITER(S): 9 INJECTION, SOLUTION INTRAVENOUS at 01:24

## 2020-02-15 RX ADMIN — Medication 1000 MILLIUNIT(S)/MIN: at 23:01

## 2020-02-15 RX ADMIN — Medication 108 GRAM(S): at 05:56

## 2020-02-15 RX ADMIN — Medication 108 GRAM(S): at 10:39

## 2020-02-15 RX ADMIN — Medication 216 GRAM(S): at 02:17

## 2020-02-15 RX ADMIN — Medication 2 MILLIUNIT(S)/MIN: at 20:13

## 2020-02-15 RX ADMIN — SODIUM CHLORIDE 125 MILLILITER(S): 9 INJECTION, SOLUTION INTRAVENOUS at 09:11

## 2020-02-15 NOTE — CHART NOTE - NSCHARTNOTEFT_GEN_A_CORE
Pt evaluated at bedside feeling increased rectal pressure    VS  T(C): 36.9 (02-15-20 @ 20:33)  HR: 99 (02-15-20 @ 21:12)  BP: 153/87 (02-15-20 @ 21:11)  RR: 18 (02-15-20 @ 01:34)  SpO2: 99% (02-15-20 @ 21:12)    SVE: 8/80/-2, more cervix on R side  FHT: 140 bpm, mod susan, -acel, -decel  [place]: q2-3    Plan: LTIOL  GBS+/amp  FHT Cat 1 re-assuring  Epi in place  Peanut ball on R side  Anticipate     d/w Dr. Renetta Ellison PGY1

## 2020-02-15 NOTE — OB NEONATOLOGY/PEDIATRICIAN DELIVERY SUMMARY - NSPEDSNEONOTESA_OBGYN_ALL_OB_FT
Baby is a 41.3wk GA female born to a 35y/o  mother via VD; labor was inducted for post-dates. PEDS called to delivery for cat II tracing and shoulder. Maternal history need a cervical clarage during this pregnancy. Maternal blood type A+. PNL negative, non-reactive, and immune. GBS positive and treated adequately with Ampicillin. AROM at 2/15 on  (5 hours prior to delivery) clear fluids. Baby born vigorous and crying spontaneously. Warmed, dried, stimulated. Apgars 8/9 for color. Pulse ox was checked which showed saturations of 100%. EOS 0.11. Mom plans to breastfeed and consents hepB.   PMD: Dr. Galvez

## 2020-02-15 NOTE — OB RN DELIVERY SUMMARY - NSSKINTOSKINA_OBGYN_ALL_OB_DIFF_HHMM
After Visit Summary   9/27/2018    Connie Longoria    MRN: [id number]           Patient Information     Date Of Birth          1960        Visit Information        Provider Department      9/27/2018 9:30 AM Bethany [facility] MD [facility]        Today's Diagnoses     Neovascular glaucoma of both eyes, severe stage    -  1    Pseudophakia of both eyes          Care Instructions    Patient will continue on Cosopt (Timolol/Dorzolamide) which is a blue top drop 2x/day (12 hours apart) in the RIGHT EYE ONLY and Alphagan (Brimonidine) which is a purple top drop 2x/day (12 hours apart) in the RIGHT EYE ONLY, and Travatan which is a teal top drop at bedtime in the RIGHT EYE ONLY.  Patient will return to clinic in 3-4 months with repeat IOP check, visual field test and discussion about further surgery in the right eye.               Follow-ups after your visit        Follow-up notes from your care team     Return  3-4 months with repeat IOP check, visual field test and discussion about further surgery in the rig.      Your next 10 appointments already scheduled     Dec 21, 2018  9:30 AM CST   RETURN GLAUCOMA with Bethany William MD   Eye Clinic ([email or web address] Clinics)    [address] [id number] [phone]              Who to contact     Please call your clinic at [phone] to:    Ask questions about your health    Make or cancel appointments    Discuss your medicines    Learn about your test results    Speak to your doctor            Additional Information About Your Visit        Care EveryWhere ID     This is your Care EveryWhere ID. This could be used by other organizations to access your [facility] medical records  [id number]         Blood Pressure from Last 3 Encounters:   11/05/17 103/61   02/23/16 128/62   11/03/15 135/78    Weight from Last 3 Encounters:   11/05/17 72.2 kg (159 lb 3.2 oz)   02/22/16 67.1 kg (148 lb)    11/03/15 68.5 kg (151 lb)              Today, you had the following     No orders found for display         Today's Medication Changes          These changes are accurate as of 9/27/18 11:03 AM.  If you have any questions, ask your nurse or doctor.               Stop taking these medicines if you haven't already. Please contact your care team if you have questions.     dorzolamide 2 % ophthalmic solution   Commonly known as:  TRUSOPT   Stopped by:  Bethany William MD                Where to get your medicines      These medications were sent to [id number] - [place], [place] - [address] AT [facility] [address]  [address], [facility] [address]     Phone:  [phone]     TRAVATAN Z 0.004 % ophthalmic solution                Primary Care Provider Office Phone # Fax #    Jessica Brand [phone]       [facility] [address]   [facility] [address]        Equal Access to Services     TEETEE RICCI AH: Hadii aad ku hadasho Soomaali, waaxda luqadaha, qaybta kaalmada adeegyada, waxay idiin hayaan adeeg kharash la'terese ah. So [facility] [phone].    ATENCIÓN: Si habla español, tiene a ron disposición servicios gratuitos de asistencia lingüística. Nidia al [phone].    We comply with applicable federal civil rights laws and [place] laws. We do not discriminate on the basis of race, color, national origin, age, disability, sex, sexual orientation, or gender identity.            Thank you!     Thank you for choosing EYE CLINIC  for your care. Our goal is always to provide you with excellent care. Hearing back from our patients is one way we can continue to improve our services. Please take a few minutes to complete the written survey that you may receive in the mail after your visit with us. Thank you!             Your Updated Medication List - Protect others around you: Learn how to safely use, store and throw away your medicines at  www.disposemymeds.org.          This list is accurate as of 9/27/18 11:03 AM.  Always use your most recent med list.                   Brand Name Dispense Instructions for use Diagnosis    aspirin 81 MG tablet      Take 81 mg by mouth daily        brimonidine 0.2 % ophthalmic solution    ALPHAGAN    1 Bottle    Place 1 drop into the right eye 2 times daily    Acute angle-closure glaucoma of right eye       Carboxymethylcell-Hypromellose 0.25-0.3 % Gel     30 mL    Place 1 Application Into the left eye 6 times daily    NVG (neovascular glaucoma), left, severe stage       dorzolamide-timolol 2-0.5 % ophthalmic solution    COSOPT    1 Bottle    Place 1 drop into the right eye 2 times daily    Neovascular glaucoma of both eyes, severe stage       FOSAMAX PO      Take 70 mg by mouth every 30 days        gabapentin 400 MG capsule    NEURONTIN     Take 400 mg by mouth 3 times daily        hydroxychloroquine 200 MG tablet    PLAQUENIL     Take 200 mg by mouth daily        inFLIXimab 100 MG injection    REMICADE     once        insulin detemir 100 UNIT/ML injection    LEVEMIR    3 mL    Inject 35 Units Subcutaneous At Bedtime    Type 2 diabetes mellitus with complication, with long-term current use of insulin (H)       LISINOPRIL PO      Take 10 mg by mouth daily        METOPROLOL TARTRATE PO      Take 25 mg by mouth daily        polyethylene glycol powder    MIRALAX/GLYCOLAX     Take 1 capful by mouth daily        PRILOSEC PO      Take 10 mg by mouth 2 times daily (before meals)        REGLAN PO      Take 5 mg by mouth        SIMVASTATIN PO      Take 10 mg by mouth At Bedtime        timolol 0.5 % ophthalmic solution    TIMOPTIC    15 mL    Place 1 drop into the right eye 2 times daily    Neovascular glaucoma, both eyes, severe stage       TRAVATAN Z 0.004 % ophthalmic solution   Generic drug:  travoprost (AKILAH Free)     1 Bottle    Place 1 drop into the right eye At Bedtime    Neovascular glaucoma of both eyes, severe stage        ZOFRAN PO      Take 4 mg by mouth           1 Hour(s) 30 Minute(s)

## 2020-02-15 NOTE — OB RN DELIVERY SUMMARY - NS_INTRAPARTUMABXTYPE_OBGYN_ALL_OB
Broad spectrum antibiotics 2-3.9 hrs prior to birth No antibiotics or any antibiotics < 2 hrs prior to birth

## 2020-02-15 NOTE — CHART NOTE - NSCHARTNOTEFT_GEN_A_CORE
R4 OB Tracing Note    T(C): 36.8 (02-15-20 @ 01:34), Max: 36.8 (02-15-20 @ 00:30)  HR: 68 (02-15-20 @ 01:34) (68 - 68)  BP: 127/61 (02-15-20 @ 01:34) (127/61 - 127/61)  RR: 18 (02-15-20 @ 01:34) (18 - 18)  SpO2: --    SVE deferred    FHT bl 135, mod variability, accels, no decels  TOCO irreg ctx    AP: 34y y/o  at 41w2d undergoing induction for AMA with cat I tracing  -continue induction with PO cytotec  -Ampicillin for GBS ppx    SHARONDA Barkley PGY4

## 2020-02-15 NOTE — OB PROVIDER H&P - PROBLEM SELECTOR PLAN 1
-Admit to L&D  -GBS positive: give ampicillin   -Routine labs  -Clear liquid diet  -Continuos fetal monitoring  -Start PO cytotec    D/w Dr. Ebony Cross PGY1

## 2020-02-15 NOTE — OB PROVIDER H&P - NSHPPHYSICALEXAM_GEN_ALL_CORE
VS  T(C): 36.8 (02-15-20 @ 00:30)  HR: 68 (02-15-20 @ 00:13)  BP: 127/61 (02-15-20 @ 00:13)      Gen: NAD, comfortable   CV: Clear S1/S2  Resp: lungs CTA  Abd: soft nontender, gravid uterus     SVE: 3.5/50/-3  FHR: 135/mod susan/-acel/-decel: cat 1  [place]: irregular ctx

## 2020-02-15 NOTE — OB PROVIDER H&P - HISTORY OF PRESENT ILLNESS
Patient is a 34 year old  at 41.2 weeks who presents for induction of labor for late term. She has followed with Dr. Jackson this pregnancy and had a rescue Shirodkar cerclage placed at 19 weeks for painless dilation. It was removed on 20. She reports some mild contractions, denies any bleeding or leakage of fluid. She reports good fetal movement.     GBS: positive  EFW: 3200g  Sono: Vertex    ObHx:   - , FT 6 lb 10oz  - , FT 7 lb 8 oz  -SAB s/p D&C  PMHx: none  Meds: none  All: none   SurgHx: Shirodkar cerclage 2019; s/p D&C for MAB

## 2020-02-15 NOTE — OB PROVIDER H&P - ASSESSMENT
34 year old  at 41.2 weeks with a Shirodkar cerclage placed this pregnancy who presents for induction of labor for late term.

## 2020-02-15 NOTE — CHART NOTE - NSCHARTNOTEFT_GEN_A_CORE
S:  Patient examined for cervical change. Complaining of back pain      O:   T(C): 37.2 (12:30)  HR: 67 (12:30)  BP: 119/66 (12:30)  RR: 18 (01:34)  SpO2: --  SVE: 3.5/80/-3  EFM: category 1  [place]: irregular      Medication - s/p PO cytotec 42@2:30pm      plan  - patient does not desire pain meds  - continue PO cytotec    SEVEN Dorsey PGY-4

## 2020-02-16 ENCOUNTER — TRANSCRIPTION ENCOUNTER (OUTPATIENT)
Age: 35
End: 2020-02-16

## 2020-02-16 RX ORDER — IBUPROFEN 200 MG
1 TABLET ORAL
Qty: 0 | Refills: 0 | DISCHARGE
Start: 2020-02-16

## 2020-02-16 RX ORDER — SENNA PLUS 8.6 MG/1
2 TABLET ORAL AT BEDTIME
Refills: 0 | Status: DISCONTINUED | OUTPATIENT
Start: 2020-02-16 | End: 2020-02-17

## 2020-02-16 RX ORDER — IBUPROFEN 200 MG
600 TABLET ORAL EVERY 6 HOURS
Refills: 0 | Status: DISCONTINUED | OUTPATIENT
Start: 2020-02-16 | End: 2020-02-17

## 2020-02-16 RX ORDER — ACETAMINOPHEN 500 MG
3 TABLET ORAL
Qty: 0 | Refills: 0 | DISCHARGE
Start: 2020-02-16

## 2020-02-16 RX ADMIN — SODIUM CHLORIDE 3 MILLILITER(S): 9 INJECTION INTRAMUSCULAR; INTRAVENOUS; SUBCUTANEOUS at 05:55

## 2020-02-16 RX ADMIN — Medication 30 MILLIGRAM(S): at 00:36

## 2020-02-16 RX ADMIN — Medication 1000 MILLIUNIT(S)/MIN: at 23:01

## 2020-02-16 RX ADMIN — Medication 600 MILLIGRAM(S): at 18:31

## 2020-02-16 RX ADMIN — Medication 1 TABLET(S): at 17:32

## 2020-02-16 RX ADMIN — Medication 30 MILLIGRAM(S): at 00:02

## 2020-02-16 RX ADMIN — Medication 600 MILLIGRAM(S): at 06:35

## 2020-02-16 RX ADMIN — SODIUM CHLORIDE 3 MILLILITER(S): 9 INJECTION INTRAMUSCULAR; INTRAVENOUS; SUBCUTANEOUS at 16:57

## 2020-02-16 RX ADMIN — Medication 600 MILLIGRAM(S): at 05:19

## 2020-02-16 RX ADMIN — SENNA PLUS 2 TABLET(S): 8.6 TABLET ORAL at 06:05

## 2020-02-16 RX ADMIN — Medication 600 MILLIGRAM(S): at 17:31

## 2020-02-16 NOTE — PROGRESS NOTE ADULT - SUBJECTIVE AND OBJECTIVE BOX
S: Patient doing well. No complaints. Minimal lochia. Pain controlled.    O: Vital Signs Last 24 Hrs  T(C): 36.8 (2020 05:26), Max: 37.2 (15 Feb 2020 12:30)  T(F): 98.2 (2020 05:26), Max: 98.96 (15 Feb 2020 12:30)  HR: 66 (2020 01:16) (63 - 151)  BP: 127/52 (2020 01:16) (107/55 - 192/112)  BP(mean): --  RR: 17 (2020 05:26) (16 - 17)  SpO2: 100% (2020 05:) (93% - 100%)    Gen: NAD  Abd: soft, Nontender, Nondistended, fundus firm  Ext: no tendern, mild edema    Labs:                        11.4   6.58  )-----------( 313      ( 15 Feb 2020 01:03 )             36.5       A: 34y PPD#1 s/p  doing well.    Plan:  Routine postpartum care  Encouraged out of bed  Regular diet

## 2020-02-16 NOTE — OB PROVIDER DELIVERY SUMMARY - NSPROVIDERDELIVERYNOTE_OBGYN_ALL_OB_FT
Head delivered from LANI position. Tight nuchal cord x1. Shoulder dystocia relieved with suprapubic pressure. Body delivered easily. Infant was suctioned. No mec. Delayed cord clamping and infant was passed to mother. Cord clamped and cut. Placenta delivered intact with a 3 vessel cord. Fundal massage was given and uterine fundus was found to be firm. Vaginal exam revealed an intact cervix, vaginal walls and sulci. Patient had a 1st degree laceration in the perineum that was repaired with 2.0 chromic suture. Excellent hemostasis was noted. Patient was stable and went to recovery. Count was correct x 2.

## 2020-02-16 NOTE — DISCHARGE NOTE OB - MEDICATION SUMMARY - MEDICATIONS TO TAKE
I will START or STAY ON the medications listed below when I get home from the hospital:    ibuprofen 600 mg oral tablet  -- 1 tab(s) by mouth every 6 hours  -- Indication: For 41 weeks gestation of pregnancy    acetaminophen 325 mg oral tablet  -- 3 tab(s) by mouth   -- Indication: For 41 weeks gestation of pregnancy    Prenatal Multivitamins with Folic Acid 1 mg oral tablet  -- 1 tab(s) by mouth once a day  -- Indication: For 41 weeks gestation of pregnancy

## 2020-02-16 NOTE — DISCHARGE NOTE OB - PATIENT PORTAL LINK FT
You can access the FollowMyHealth Patient Portal offered by [facility] by registering at the following website: [email or web address]. By joining [facility]’s FollowMyHealth portal, you will also be able to view your health information using other applications (apps) compatible with our system.

## 2020-02-17 VITALS
HEART RATE: 71 BPM | OXYGEN SATURATION: 100 % | SYSTOLIC BLOOD PRESSURE: 126 MMHG | DIASTOLIC BLOOD PRESSURE: 63 MMHG | RESPIRATION RATE: 18 BRPM | TEMPERATURE: 98 F

## 2020-02-17 LAB — T PALLIDUM AB TITR SER: NEGATIVE — SIGNIFICANT CHANGE UP

## 2020-02-17 RX ADMIN — Medication 600 MILLIGRAM(S): at 11:05

## 2020-02-17 RX ADMIN — Medication 600 MILLIGRAM(S): at 12:00

## 2020-03-27 ENCOUNTER — APPOINTMENT (OUTPATIENT)
Dept: OBGYN | Facility: CLINIC | Age: 35
End: 2020-03-27

## 2020-04-13 ENCOUNTER — APPOINTMENT (OUTPATIENT)
Dept: OBGYN | Facility: CLINIC | Age: 35
End: 2020-04-13

## 2020-05-13 NOTE — OB PROVIDER H&P - NSLASTDATEOFPRENATALVISIT_OBGYN_ALL_OB_DT
Expected Date Of Service: 05/13/2020 Bill For Surgical Tray: no Billing Type: Third-Party Bill 10-Feb-2020

## 2020-07-24 NOTE — OB RN DELIVERY SUMMARY - BABY A SEX
Drysol Pregnancy And Lactation Text: This medication is considered safe during pregnancy and breast feeding. Female

## 2020-09-05 NOTE — OB PROVIDER H&P - NSHOSPITALIZATION_OBGYN_ALL_OB
- Continue home med Aricept and Namenda   -Continue Tegretol , Seroquel and Venlafaxine     
- Followed by outside Hepatologist   -Detailed History is unknown - treated or not treated           
- Get CXR  -Initial UA not reflecting infection - UA Nitrite neg, Leukocyte neg, WBC 5, Sq epi 30.   -Get Urine culture   -Repeat CBC in am     
- Hold ACEi or ARB   -Hydralazine 10 mg po tid     
- Hold Metformin   -Accu check AC and HS and Insulin sliding scale     
- Related to CATARINO though AG nl  -Assess response to hydration   -Monitor labs     
-Serum creatinine 4.1  -Baseline creatinine 1.2 to 1.6   -Get U/S Kidney /bladder   -UA 1+ Protein , 3+ Blood. Get spot urine protein creatinine ratio  -Urine sodium   -Start IV hydration with NS   - Assess response to IVF  -Monitor renal indices   -Nephrology consult   -Avoid Nephrotoxic medicine   -Hold Home Metformin     
63 y/o male with CATARINO and decreased UOP:     CATARINO (acute kidney injury)  CATARINO.  Suspect due to poor PO intake (+ketones in u/a) and dehydration (elevated urine specific gravity, tachycardia, and mild hypercalcemia)  However, there may be other conditions present (last baseline s Cr was 8 months ago)  DDX: hep C induced renal disease (has hematuria and proteinuria)  DDX: myeloma (anemia, hypercalcemia)  DDX: related to DM )but diabetic nephropathy does not happen this fast)  Renal u/s ruled out hydronephrosis  Will follow up with repeat labs  Will monitor     HTN: BP controlled     T2DM (type 2 diabetes mellitus)  Long standing h/o  Chart was reviewed     Dementia associated with alcoholism without behavioral disturbance  Chart was reviewed  Brother speaks for the pt     Chronic hepatitis C virus infection  Chart was reviewed  DDX: for renal failure includes hep C induced renal disease/glomerulonephritis   Will send pertinent serologies: complements C3 and C4, RF        Plans and recommendations:  As dicussed above  
CATARINO.  Suspect due to poor PO intake (+ketones in u/a) and dehydration (elevated urine specific gravity, tachycardia, and mild hypercalcemia)  However, there may be other conditions present (last baseline s Cr was 8 months ago)  DDX: hep C induced renal disease (has hematuria and proteinuria)  DDX: myeloma (anemia, hypercalcemia)  DDX: related to DM )but diabetic nephropathy does not happen this fast)  Will follow up with repeat labs  Will monitor    HTN: BP controlled  
Chart was reviewed  Brother speaks for the pt  
Chart was reviewed  DDX: for renal failure includes hep C induced renal disease/glomerulonephritis   Will send pertinent serologies: complements C3 and C4, RF  
Long standing h/o  Chart was reviewed  
No

## 2021-03-01 ENCOUNTER — APPOINTMENT (OUTPATIENT)
Dept: OBGYN | Facility: CLINIC | Age: 36
End: 2021-03-01
Payer: COMMERCIAL

## 2021-03-01 PROCEDURE — 99072 ADDL SUPL MATRL&STAF TM PHE: CPT

## 2021-03-01 PROCEDURE — 99395 PREV VISIT EST AGE 18-39: CPT

## 2021-03-08 ENCOUNTER — APPOINTMENT (OUTPATIENT)
Dept: OBGYN | Facility: CLINIC | Age: 36
End: 2021-03-08
Payer: COMMERCIAL

## 2021-03-08 PROCEDURE — 99072 ADDL SUPL MATRL&STAF TM PHE: CPT

## 2021-03-08 PROCEDURE — 57455 BIOPSY OF CERVIX W/SCOPE: CPT

## 2021-05-18 NOTE — PATIENT PROFILE OB - PRO VDRL INFANT
"Elena Valiente 1988     Office/Outpatient Visit    Visit Date: Wed, Jul 25, 2018 11:46 am    Provider: Akiko Villeda N.P. (Assistant: Omaira Yanez RN)    Location: [facility]        Electronically signed by Akiko Villeda N.P. on  07/25/2018 03:55:19 PM                             SUBJECTIVE:        CC:     Ms. Valiente is a 30 year old White female.  The patient is accompanied into the exam room by her mother.  Yearly physical;         HPI:         HEALTH RISK PROFILE (\"At Risk\" items are starred):     Smoking: Life-long non-smoker;     Cancer Screening: ** Does not perform breast self-exams; ;     ** She is not current with recommended Pap smears; (she has never been sexually active)     Immunization Status: ** >10 years since last Td booster;     Nutrition: ** Weight is above the healthy range for height;     Physical Activity: ** Exercises infrequently;     Alcohol/Drug Use: Is a non-drinker; No illicit drug use;     Safety: Always wears seatbelt; Concerning health checkup,         PHQ-9 Depression Screening: Completed form scanned and in chart; Total Score enter total score:0 Alcohol Consumption Screening: Completed form scanned and in chart; Total Score enter total score:0     ROS:     CONSTITUTIONAL:  Negative for chills and fever.      EYES:  Negative for blurred vision.      E/N/T:  Positive for is wearing braces.   Negative for diminished hearing or nasal congestion.      CARDIOVASCULAR:  Negative for chest pain and palpitations.      RESPIRATORY:  Negative for recent cough and dyspnea.      GASTROINTESTINAL:  Negative for abdominal pain, nausea and vomiting.      GENITOURINARY:  Negative for irregular menstrual cycle.      MUSCULOSKELETAL:  Negative for arthralgias and myalgias.      INTEGUMENTARY/BREAST:  Negative for atypical mole(s) and rash.      NEUROLOGICAL:  Negative for paresthesias and weakness.      PSYCHIATRIC:  Negative for anxiety and depression.          " PMH/FMH/SH:     Last Reviewed on 2018 12:02 PM by Akiko Villeda    Past Medical History:                 PAST MEDICAL HISTORY         Down's Syndrome         GYNECOLOGICAL HISTORY:    G0    No problems with menstrual cycles. Sexually Active? No;         Surgical History:     NONE         Family History:         Positive for Arrhythmia ( father ) and Coronary Artery Disease ( mother; mat. GM; pat. uncle ).  Father: Arrhythmia     Mother: Hypertension; Hyperlipidemia; Myocardial Infarction ( age 55 )     Brother(s): 3 brother(s) total;  Hypertension     Paternal Grandfather:  at age 65;  Alcoholism     Paternal Grandmother: Healthy     Maternal Grandfather: Myocardial Infarction     Maternal Grandmother:  at age 86;  Myocardial Infarction;  Cerebrovascular Accident         Social History:         Household:  Lives with her parents.  Occupation: [employer]     Marital Status: Single     Children: None         Tobacco/Alcohol/Supplements:     Last Reviewed on 2018 11:54 AM by Omaira Yanez    Tobacco: She has never smoked.          Substance Abuse History:     Last Reviewed on 2018 12:37 PM by Akiko Villeda    NEGATIVE             Immunizations:     Flulaval 2007         Allergies:     Last Reviewed on 2018 11:54 AM by Omaira Yanez      No Known Drug Allergies.         Current Medications:     Last Reviewed on 2018 11:54 AM by Omaira Yanez    Zyrtec 10mg Tablet 1 tab daily prn         OBJECTIVE:        Vitals:         Current: 2018 11:53:44 AM    Ht:  4 ft, 11 in;  Wt: 191 lbs;  BMI: 38.6    T: 97.1 F (oral);  BP: 106/72 mm Hg (left arm, sitting);  P: 78 bpm (left arm (BP Cuff), sitting);  sCr: 0.75 mg/dL;  GFR: 111.88        Exams:     PHYSICAL EXAM:     GENERAL: vital signs recorded - well developed, well nourished, obese;  no apparent distress;     EYES: extraocular movements intact; conjunctiva and cornea are normal; PERRLA;     E/N/T:  EARS: small ears/canals, TM's normal; NOSE:  normal nasal mucosa, septum, turbinates, and sinuses; OROPHARYNX:  normal mucosa, dentition, gingiva, and posterior pharynx;     NECK: range of motion is normal; thyroid is non-palpable;     RESPIRATORY: normal respiratory rate and pattern with no distress; normal breath sounds with no rales, rhonchi, wheezes or rubs;     CARDIOVASCULAR: normal rate; rhythm is regular;  no systolic murmur; no edema;     GASTROINTESTINAL: nontender; normal bowel sounds; no organomegaly;     LYMPHATIC: no enlargement of cervical or facial nodes; no axillary adenopathy;     BREAST/INTEGUMENT: BREASTS: breast exam is normal without masses, skin changes, or nipple discharge; SKIN: no significant rashes or lesions; no suspicious moles;     MUSCULOSKELETAL:  Normal range of motion, strength and tone;     NEUROLOGIC: GROSSLY INTACT     PSYCHIATRIC:  appropriate affect and demeanor; normal speech pattern; grossly normal memory;         ASSESSMENT           V70.0   Z00.00  Health checkup              DDx:     V79.0   Z13.89  Screening for depression              DDx:     V79.0   Z13.89  Screening for depression              DDx:         ORDERS:         Lab Orders:       FUTURE  Future order to be done at patients convenience  (Send-Out)         [id number]  [facility] PHYSICAL: CMP, CBC, TSH, LIPID: [id number], [id number], [id number], [id number]  (Send-Out)           Other Orders:         Negative EtOH screen  (In-House)                   PLAN:          Health checkup advised to get hep a and Adacel vaccines, to check with her pharmacy on coverage, to consider a pap smear         FOLLOW-UP TESTING #1: FOLLOW-UP LABORATORY:  Labs to be scheduled in the future include PHYSICAL PANEL; CMP, CBC, TSH, LIPID.      COUNSELING was provided today regarding the following topics: healthy eating habits, regular exercise, and breast self-exam.      FOLLOW-UP: fasting for labs           Orders:       FUTURE  Future order to be done  at patients convenience  (Send-Out)         [id number]  [facility] PHYSICAL: CMP, CBC, TSH, LIPID: [id number], [id number], [id number], [id number]  (Send-Out)             Patient Education Handouts:       Hepatitis A           Screening for depression     MIPS PHQ-9 Depression Screening: Completed form scanned and in chart; Total Score 0 Negative alcohol screen           Orders:         Negative EtOH screen  (In-House)               Patient Recommendations:        For  Health checkup:             The following laboratory testing has been ordered:     Limit dietary intake of fat (especially saturated fat) and cholesterol.  Eat a variety of foods, including plenty of fruits, vegetables, and grain containg fiber, limit fat intake to 30% of total calories. Balance caloric intake with energy expended.    Maintaining regular physical activity is advised to help prevent heart disease, hypertension, diabetes, and obesity.    You should regularly examine your breasts, easily done while in the shower or with lotion.  Feel and look for differences in consistency from month to month, especially noting knots or lumps, changes in skin appearance, nipple retraction or discharge.              CHARGE CAPTURE           **Please note: ICD descriptions below are intended for billing purposes only and may not represent clinical diagnoses**        Primary Diagnosis:         V70.0 Health checkup            Z00.00    Encounter for general adult medical examination without abnormal findings              Orders:          [id number]   Preventive medicine, established patient, age 18-39 years  (In-House)           V79.0 Screening for depression            Z13.89    Encounter for screening for other disorder              Orders:             Negative EtOH screen  (In-House)           V79.0 Screening for depression            Z13.89    Encounter for screening for other disorder        ADDENDUMS:      ____________________________________    Addendum: 07/27/2018 03:34  PM - Two, Team         Visit Note Faxed to:        [facility]; Number [phone]                negative

## 2022-02-14 NOTE — H&P PST ADULT - ASSESSMENT
Pt. is a 34 yo pregnant female with an incompetent cervix. Complex Repair And Graft Additional Text (Will Appearing After The Standard Complex Repair Text): The complex repair was not sufficient to completely close the primary defect. The remaining additional defect was repaired with the graft mentioned below.

## 2022-05-21 NOTE — OB RN PATIENT PROFILE - NS PRO TALK SOMEONE YN
05/21/22 1844   Pain Assessment   Pain Assessment 0-10   Pain Level 0   Patient's Stated Pain Goal 8   Pain Location Abdomen   Pain Orientation Right   Pain Descriptors Aching   Functional Pain Assessment Prevents or interferes with all active and some passive activities   Pain Type Acute pain   Pain Frequency Continuous   Pain Onset On-going   Non-Pharmaceutical Pain Intervention(s) Declines   Response to Pain Intervention Pain improved but above pain goal   PRN pain medication administered at this time. no

## 2022-06-02 NOTE — OB RN PATIENT PROFILE - BP NONINVASIVE SYSTOLIC (MM HG)
Patient would like communication of their results via:    Cell Phone:   Telephone Information:   Mobile [phone]     Okay to leave a message containing results? Yes     Health Maintenance Due   Topic Date Due   • COVID-19 Vaccine (3 - Moderna risk series) 05/29/2021                    127

## 2022-09-16 NOTE — OB PROVIDER H&P - NSINFECTIONS_OBGYN_ALL_OB
Spoke with patient.  She believes Dr. Dick instructed her to increase to 1200mg, 900mg, 1200mg for one week.  Then 1200mg TID.  She has been taking 1200mg TID.  She states she is tolerating that dose without side effects.     Discussed with Dr. Dick.  As long as patient is tolerating 1200mg TID and not feeling foggy, she can continue to take that dose.    Updated order sent to pharmacy.   None

## 2022-11-02 NOTE — OB RN DELIVERY SUMMARY - NS_SEPSISRSKCALC_OBGYN_ALL_OB_FT
PSG, CBTI and follow up appt have been scheduled.      LUX [place]         EOS calculated successfully. EOS Risk Factor: 0.5/1000 live births ([facility] national incidence); GA=41w2d; Temp=98.96; ROM=3.7; GBS='Positive'; Antibiotics='Broad spectrum antibiotics 2-3.9 hrs prior to birth' EOS calculated successfully. EOS Risk Factor: 0.5/1000 live births ([facility] national incidence); GA=41w2d; Temp=98.96; ROM=3.7; GBS='Positive'; Antibiotics='No antibiotics or any antibiotics < 2 hrs prior to birth'

## 2022-11-07 ENCOUNTER — APPOINTMENT (OUTPATIENT)
Dept: OBGYN | Facility: CLINIC | Age: 37
End: 2022-11-07
Payer: COMMERCIAL

## 2022-11-07 ENCOUNTER — TRANSCRIPTION ENCOUNTER (OUTPATIENT)
Age: 37
End: 2022-11-07

## 2022-11-07 VITALS — DIASTOLIC BLOOD PRESSURE: 81 MMHG | SYSTOLIC BLOOD PRESSURE: 133 MMHG | WEIGHT: 179 LBS

## 2022-11-07 DIAGNOSIS — Z78.9 OTHER SPECIFIED HEALTH STATUS: ICD-10-CM

## 2022-11-07 PROCEDURE — 99395 PREV VISIT EST AGE 18-39: CPT

## 2022-11-07 RX ORDER — MULTIVITAMIN
TABLET ORAL
Refills: 0 | Status: ACTIVE | COMMUNITY

## 2022-11-07 NOTE — HISTORY OF PRESENT ILLNESS
[Y] : Positive pregnancy history [Regular Cycle Intervals] : periods have been regular [Currently Active] : currently active [Men] : men [Vaginal] : vaginal [No] : No [Condoms] : Condoms [Patient refuses STI testing] : Patient refuses STI testing [LMPDate] : 10/19/22 [PGHxTotal] : 4 [[facility]] : 3 [PGHxPremature] : 0 [PGHxAbortions] : 0 [[facility] Hospital[facility]] : 3 [PGHxABInduced] : 0 [PGHxABSpont] : 1 [PGHxEctopic] : 0 [PGHxMultBirths] : 0 [FreeTextEntry1] : 10/19/22 [FreeTextEntry3] : pt expresses interest in having Mirena placed

## 2022-11-07 NOTE — PLAN
[FreeTextEntry1] : 36 y/o female presenting for annual exam:\par -f/u pap and GC/CT\par -Requisition given for mammogram\par -Contraception: condoms\par -f/u PRN\par

## 2022-11-07 NOTE — PHYSICAL EXAM
[Chaperone Present] : A chaperone was present in the examining room during all aspects of the physical examination [FreeTextEntry1] : [place] [Appropriately responsive] : appropriately responsive [Alert] : alert [No Acute Distress] : no acute distress [Soft] : soft [Non-tender] : non-tender [Non-distended] : non-distended [No HSM] : No HSM [No Lesions] : no lesions [No Mass] : no mass [Oriented x3] : oriented x3 [Examination Of The Breasts] : a normal appearance [No Masses] : no breast masses were palpable [Labia Majora] : normal [Labia Minora] : normal [Normal] : normal [Uterine Adnexae] : normal

## 2022-11-08 ENCOUNTER — NON-APPOINTMENT (OUTPATIENT)
Age: 37
End: 2022-11-08

## 2022-11-08 LAB
C TRACH RRNA SPEC QL NAA+PROBE: NOT DETECTED
HPV HIGH+LOW RISK DNA PNL CVX: NOT DETECTED
HPV HIGH+LOW RISK DNA PNL CVX: NOT DETECTED
N GONORRHOEA RRNA SPEC QL NAA+PROBE: NOT DETECTED
SOURCE AMPLIFICATION: NORMAL

## 2022-11-09 LAB
HPV 16 E6+E7 MRNA CVX QL NAA+PROBE: NOT DETECTED
HPV18+45 E6+E7 MRNA CVX QL NAA+PROBE: NOT DETECTED

## 2022-11-13 LAB — CYTOLOGY CVX/VAG DOC THIN PREP: NORMAL

## 2022-11-21 ENCOUNTER — APPOINTMENT (OUTPATIENT)
Dept: OBGYN | Facility: CLINIC | Age: 37
End: 2022-11-21

## 2022-11-21 VITALS — DIASTOLIC BLOOD PRESSURE: 84 MMHG | WEIGHT: 180 LBS | SYSTOLIC BLOOD PRESSURE: 142 MMHG

## 2022-11-21 PROCEDURE — 58300 INSERT INTRAUTERINE DEVICE: CPT

## 2022-11-21 RX ORDER — COPPER 313.4 MG/1
INTRAUTERINE DEVICE INTRAUTERINE
Qty: 0 | Refills: 0 | Status: COMPLETED | OUTPATIENT
Start: 2022-11-21

## 2022-11-21 RX ADMIN — COPPER 0: 313.4 INTRAUTERINE DEVICE INTRAUTERINE at 00:00

## 2022-11-21 NOTE — ASSESSMENT
[FreeTextEntry1] : 38 y/o F presenting for IUD insertion\par -IUD inserted without complication (please see procedure note for full details)\par -Patient given a User Card with instructions to follow up as needed and to have the device removed in 10 years\par -f/u 4 weeks for IUD sono\par

## 2022-11-21 NOTE — PROCEDURE
[IUD Placement] : intrauterine device (IUD) placement [Time out performed] : Pre-procedure time out performed.  Patient's name, date of birth and procedure confirmed. [Consent Obtained] : Consent obtained [Prevention of Pregnancy] : prevention of pregnancy [Risks] : risks [Benefits] : benefits [Alternatives] : alternatives [Patient] : patient [Betadine] : Betadine [Easy Passage] : Easy passage [ParaGard IUD] : ParaGard IUD [Tolerated Well] : Patient tolerated the procedure well [No Complications] : No complications [None] : None [LMPDate] : 11/21/22 [de-identified] : na [de-identified] : [id number] [de-identified] : 07/2018

## 2022-12-30 ENCOUNTER — APPOINTMENT (OUTPATIENT)
Dept: ANTEPARTUM | Facility: CLINIC | Age: 37
End: 2022-12-30
Payer: COMMERCIAL

## 2022-12-30 ENCOUNTER — APPOINTMENT (OUTPATIENT)
Dept: OBGYN | Facility: CLINIC | Age: 37
End: 2022-12-30
Payer: COMMERCIAL

## 2022-12-30 VITALS — WEIGHT: 178 LBS | DIASTOLIC BLOOD PRESSURE: 81 MMHG | SYSTOLIC BLOOD PRESSURE: 137 MMHG

## 2022-12-30 DIAGNOSIS — Z97.5 PRESENCE OF (INTRAUTERINE) CONTRACEPTIVE DEVICE: ICD-10-CM

## 2022-12-30 DIAGNOSIS — Z30.431 ENCOUNTER FOR ROUTINE CHECKING OF INTRAUTERINE CONTRACEPTIVE DEVICE: ICD-10-CM

## 2022-12-30 PROCEDURE — 99213 OFFICE O/P EST LOW 20 MIN: CPT

## 2022-12-30 PROCEDURE — 76830 TRANSVAGINAL US NON-OB: CPT

## 2022-12-30 PROCEDURE — 76857 US EXAM PELVIC LIMITED: CPT

## 2022-12-30 NOTE — HISTORY OF PRESENT ILLNESS
[FreeTextEntry1] : 36 yo female presents today for gyn sono to check iud position s/p placement on 11/21/22. Patient reports an LMP of 12/17/22.

## 2022-12-30 NOTE — PLAN
[FreeTextEntry1] : 38 yo female for gyn sono:\par -iud in situ; see official sono report\par -f/u prn

## 2023-11-10 ENCOUNTER — APPOINTMENT (OUTPATIENT)
Dept: OBGYN | Facility: CLINIC | Age: 38
End: 2023-11-10
Payer: COMMERCIAL

## 2023-11-10 VITALS
WEIGHT: 163 LBS | SYSTOLIC BLOOD PRESSURE: 121 MMHG | BODY MASS INDEX: 27.83 KG/M2 | DIASTOLIC BLOOD PRESSURE: 74 MMHG | HEIGHT: 64 IN

## 2023-11-10 DIAGNOSIS — Z01.419 ENCOUNTER FOR GYNECOLOGICAL EXAMINATION (GENERAL) (ROUTINE) W/OUT ABNORMAL FINDINGS: ICD-10-CM

## 2023-11-10 DIAGNOSIS — Z30.430 ENCOUNTER FOR INSERTION OF INTRAUTERINE CONTRACEPTIVE DEVICE: ICD-10-CM

## 2023-11-10 PROCEDURE — 99395 PREV VISIT EST AGE 18-39: CPT

## 2023-11-11 LAB
C TRACH RRNA SPEC QL NAA+PROBE: NOT DETECTED
N GONORRHOEA RRNA SPEC QL NAA+PROBE: NOT DETECTED
SOURCE AMPLIFICATION: NORMAL

## 2023-11-16 LAB
CYTOLOGY CVX/VAG DOC THIN PREP: NORMAL
HPV HIGH+LOW RISK DNA PNL CVX: DETECTED
HPV HIGH+LOW RISK DNA PNL CVX: DETECTED

## 2023-12-12 NOTE — OB RN PATIENT PROFILE - NS_NPO_OBGYN_ALL_OB_DT
14-Feb-2020 22:00

## 2024-06-06 NOTE — PACU DISCHARGE NOTE - HYDRATION STATUS:
Satisfactory Render In Strict Bullet Format?: No Initiate Treatment: hydroquinone 4 % topical cream \\nQuantity: 28.4 g  Days Supply: 30\\nSig: Apply QD  to face x 3 months Detail Level: Zone

## 2025-01-03 ENCOUNTER — APPOINTMENT (OUTPATIENT)
Dept: OBGYN | Facility: CLINIC | Age: 40
End: 2025-01-03

## 2025-01-03 VITALS — SYSTOLIC BLOOD PRESSURE: 144 MMHG | DIASTOLIC BLOOD PRESSURE: 80 MMHG | WEIGHT: 175 LBS | BODY MASS INDEX: 30.04 KG/M2

## 2025-01-03 DIAGNOSIS — Z01.419 ENCOUNTER FOR GYNECOLOGICAL EXAMINATION (GENERAL) (ROUTINE) W/OUT ABNORMAL FINDINGS: ICD-10-CM

## 2025-01-03 PROCEDURE — 99386 PREV VISIT NEW AGE 40-64: CPT

## 2025-01-03 PROCEDURE — G0444 DEPRESSION SCREEN ANNUAL: CPT | Mod: 59

## 2025-01-03 PROCEDURE — 99459 PELVIC EXAMINATION: CPT

## 2025-01-08 LAB
C TRACH RRNA SPEC QL NAA+PROBE: NOT DETECTED
CYTOLOGY CVX/VAG DOC THIN PREP: NORMAL
HPV HIGH+LOW RISK DNA PNL CVX: NOT DETECTED
N GONORRHOEA RRNA SPEC QL NAA+PROBE: NOT DETECTED
SOURCE AMPLIFICATION: NORMAL

## 2025-07-26 NOTE — DISCHARGE NOTE ANTEPARTUM - MEDICATION SUMMARY - MEDICATIONS TO CHANGE
Problem: Safety - Adult  Goal: Free from fall injury  Outcome: Progressing     Problem: Chronic Conditions and Co-morbidities  Goal: Patient's chronic conditions and co-morbidity symptoms are monitored and maintained or improved  7/26/2025 1756 by Rika Malagon, RN  Outcome: Progressing  7/26/2025 0618 by Pierre Mancilla, RN  Outcome: Progressing      I will SWITCH the dose or number of times a day I take the medications listed below when I get home from the hospital:  None